# Patient Record
Sex: FEMALE | Race: WHITE | HISPANIC OR LATINO | Employment: FULL TIME | URBAN - METROPOLITAN AREA
[De-identification: names, ages, dates, MRNs, and addresses within clinical notes are randomized per-mention and may not be internally consistent; named-entity substitution may affect disease eponyms.]

---

## 2023-03-27 ENCOUNTER — OFFICE VISIT (OUTPATIENT)
Dept: FAMILY MEDICINE CLINIC | Facility: CLINIC | Age: 28
End: 2023-03-27

## 2023-03-27 VITALS
DIASTOLIC BLOOD PRESSURE: 80 MMHG | RESPIRATION RATE: 16 BRPM | WEIGHT: 179 LBS | BODY MASS INDEX: 28.09 KG/M2 | TEMPERATURE: 99.1 F | HEIGHT: 67 IN | HEART RATE: 77 BPM | OXYGEN SATURATION: 99 % | SYSTOLIC BLOOD PRESSURE: 118 MMHG

## 2023-03-27 DIAGNOSIS — E78.2 MIXED HYPERLIPIDEMIA: Primary | ICD-10-CM

## 2023-03-27 DIAGNOSIS — J02.9 SORE THROAT: ICD-10-CM

## 2023-03-27 DIAGNOSIS — J30.89 SEASONAL ALLERGIC RHINITIS DUE TO OTHER ALLERGIC TRIGGER: ICD-10-CM

## 2023-03-27 DIAGNOSIS — K21.9 GASTROESOPHAGEAL REFLUX DISEASE WITHOUT ESOPHAGITIS: ICD-10-CM

## 2023-03-27 DIAGNOSIS — Z00.01 ABNORMAL WELLNESS EXAM: ICD-10-CM

## 2023-03-27 PROBLEM — Z86.19 HISTORY OF LYME DISEASE: Status: ACTIVE | Noted: 2018-09-24

## 2023-03-27 PROBLEM — F90.9 ADHD: Status: ACTIVE | Noted: 2021-09-10

## 2023-03-27 PROBLEM — H60.543 ECZEMA OF EXTERNAL EAR, BILATERAL: Status: ACTIVE | Noted: 2018-03-30

## 2023-03-27 PROBLEM — R14.0 ABDOMINAL BLOATING: Status: ACTIVE | Noted: 2018-03-30

## 2023-03-27 PROBLEM — R19.8 ALTERNATING CONSTIPATION AND DIARRHEA: Status: ACTIVE | Noted: 2018-03-30

## 2023-03-27 PROBLEM — Z91.09 ENVIRONMENTAL ALLERGIES: Status: ACTIVE | Noted: 2021-08-13

## 2023-03-27 RX ORDER — ALPRAZOLAM 0.25 MG/1
0.25 TABLET ORAL DAILY
COMMUNITY
Start: 2023-02-02

## 2023-03-27 RX ORDER — UBIDECARENONE 75 MG
CAPSULE ORAL DAILY
COMMUNITY

## 2023-03-27 RX ORDER — LEVONORGESTREL AND ETHINYL ESTRADIOL 0.1-0.02MG
KIT ORAL
COMMUNITY
Start: 2023-03-16

## 2023-03-27 RX ORDER — FERROUS SULFATE 325(65) MG
325 TABLET ORAL
COMMUNITY

## 2023-03-27 RX ORDER — MELATONIN
5000 DAILY
COMMUNITY

## 2023-03-27 RX ORDER — FEXOFENADINE HCL 180 MG/1
180 TABLET ORAL
COMMUNITY

## 2023-03-27 RX ORDER — PANTOPRAZOLE SODIUM 40 MG/1
40 TABLET, DELAYED RELEASE ORAL
Qty: 30 TABLET | Refills: 1 | Status: SHIPPED | OUTPATIENT
Start: 2023-03-27

## 2023-03-27 RX ORDER — METFORMIN HYDROCHLORIDE 500 MG/1
TABLET, EXTENDED RELEASE ORAL
COMMUNITY
Start: 2023-02-08

## 2023-03-27 RX ORDER — DEXTROAMPHETAMINE SACCHARATE, AMPHETAMINE ASPARTATE, DEXTROAMPHETAMINE SULFATE AND AMPHETAMINE SULFATE 2.5; 2.5; 2.5; 2.5 MG/1; MG/1; MG/1; MG/1
TABLET ORAL
COMMUNITY
Start: 2023-02-18

## 2023-03-27 RX ORDER — FLUTICASONE PROPIONATE 50 MCG
2 SPRAY, SUSPENSION (ML) NASAL DAILY
Qty: 16 G | Refills: 0 | Status: SHIPPED | OUTPATIENT
Start: 2023-03-27

## 2023-03-28 ENCOUNTER — TELEPHONE (OUTPATIENT)
Dept: FAMILY MEDICINE CLINIC | Facility: CLINIC | Age: 28
End: 2023-03-28

## 2023-03-28 RX ORDER — SEMAGLUTIDE 0.5 MG/.5ML
INJECTION, SOLUTION SUBCUTANEOUS
Refills: 0 | OUTPATIENT
Start: 2023-03-28

## 2023-03-29 ENCOUNTER — TELEPHONE (OUTPATIENT)
Dept: FAMILY MEDICINE CLINIC | Facility: CLINIC | Age: 28
End: 2023-03-29

## 2023-04-02 PROBLEM — K21.9 GASTROESOPHAGEAL REFLUX DISEASE WITHOUT ESOPHAGITIS: Status: ACTIVE | Noted: 2023-04-02

## 2023-04-02 PROBLEM — Z00.01 ABNORMAL WELLNESS EXAM: Status: ACTIVE | Noted: 2023-04-02

## 2023-04-02 PROBLEM — J30.9 ALLERGIC RHINITIS DUE TO ALLERGEN: Status: ACTIVE | Noted: 2023-04-02

## 2023-04-02 PROBLEM — J02.9 SORE THROAT: Status: ACTIVE | Noted: 2023-04-02

## 2023-04-02 NOTE — ASSESSMENT & PLAN NOTE
She was given prescription for pantoprazole  Discussed about possible side effects  We will continue to monitor

## 2023-04-02 NOTE — ASSESSMENT & PLAN NOTE
She was given prescription for pantoprazole  Discussed about possible side effects  We will discussed with patient if symptoms continue I will consider seeing ENT

## 2023-04-02 NOTE — PROGRESS NOTES
Name: Melanie José      : 1995      MRN: [id number]  Encounter Provider: Devon Meza MD  Encounter Date: 3/27/2023   Encounter department: [address] [place] [address]  Mixed hyperlipidemia  Assessment & Plan:  Not well controlled  It was discussed about low-fat diet and regular exercise  Continue to check her fasting lipid profile  I will consider medication if it continues to be elevated  Orders:  -     CBC and differential; Future  -     Comprehensive metabolic panel; Future  -     Lipid Panel with Direct LDL reflex; Future  -     TSH, 3rd generation with Free T4 reflex; Future    2  Gastroesophageal reflux disease without esophagitis  Assessment & Plan:  She was given prescription for pantoprazole  Discussed about possible side effects  We will continue to monitor  Orders:  -     pantoprazole (PROTONIX) 40 mg tablet; Take 1 tablet (40 mg total) by mouth daily before breakfast    3  Seasonal allergic rhinitis due to other allergic trigger  Assessment & Plan:  Continue Allegra-D and was given prescription for Flonase nasal spray  She was controlled pantoprazole will help with her symptoms too  Orders:  -     fluticasone (FLONASE) 50 mcg/act nasal spray; 2 sprays into each nostril daily    4  Sore throat  Assessment & Plan:  She was given prescription for pantoprazole  Discussed about possible side effects  We will discussed with patient if symptoms continue I will consider seeing ENT  Orders:  -     pantoprazole (PROTONIX) 40 mg tablet; Take 1 tablet (40 mg total) by mouth daily before breakfast    5  BMI 28 0-28 9,adult  -     Semaglutide-Weight Management (WEGOVY) 0 5 MG/0 5ML; Inject 0 5 mL (0 5 mg total) under the skin once a week for 28 days    6  Abnormal wellness exam  Assessment & Plan: It was discussed about immunizations, diet, exercise and safety measures            Depression Screening and Follow-up Plan: Patient was screened for depression during today's encounter  They screened negative with a PHQ-2 score of 0  Subjective     She continues to be symptomatic problems and with complaint of having increased symptoms of allergic rhinitis and sore throat for the last few weeks  She denies any fever or chills  She also complains of symptoms of acid reflux on and off  She has history of hyperlipidemia  Review of Systems   Constitutional: Negative for chills and fever  HENT: Positive for congestion, postnasal drip and sore throat  Negative for trouble swallowing  Eyes: Negative for visual disturbance  Respiratory: Negative for cough and shortness of breath  Cardiovascular: Negative for chest pain, palpitations and leg swelling  Gastrointestinal: Negative for abdominal pain, constipation and diarrhea  Endocrine: Negative for cold intolerance and heat intolerance  Genitourinary: Negative for difficulty urinating and dysuria  Musculoskeletal: Negative for gait problem  Skin: Negative for rash  Neurological: Negative for dizziness, tremors, seizures and headaches  Hematological: Negative for adenopathy  Psychiatric/Behavioral: Negative for behavioral problems  Past Medical History:   Diagnosis Date   • ADHD    • Anxiety    • IBS (irritable bowel syndrome)      Past Surgical History:   Procedure Laterality Date   • SINUS SURGERY       Family History   Problem Relation Age of Onset   • Stroke Father    • ADD / ADHD Brother    • Breast cancer Maternal Grandmother    • Hodgkin's lymphoma Maternal Aunt      Social History     Socioeconomic History   • Marital status: Single     Spouse name: None   • Number of children: None   • Years of education: None   • Highest education level: None   Occupational History   • None   Tobacco Use   • Smoking status: Never     Passive exposure: Never   • Smokeless tobacco: Never   Vaping Use   • Vaping Use: Some days   Substance and Sexual Activity   • Alcohol use:  Yes   • Drug use: Never   • Sexual activity: None   Other Topics Concern   • None   Social History Narrative   • None     Social Determinants of Health     Financial Resource Strain: Not on file   Food Insecurity: Not on file   Transportation Needs: Not on file   Physical Activity: Not on file   Stress: Not on file   Social Connections: Not on file   Intimate Partner Violence: Not on file   Housing Stability: Not on file     Current Outpatient Medications on File Prior to Visit   Medication Sig   • ALPRAZolam (XANAX) 0 25 mg tablet Take 0 25 mg by mouth daily   • amphetamine-dextroamphetamine (ADDERALL) 10 mg tablet TAKE 1 TABLET BY MOUTH TWICE A DAY DURING WAKING HOURS (DNF 11/25/22)   • Balcoltra 0 1-20 MG-MCG(21) TABS TAKE 1 TABLET BY ORAL ROUTE EVERY DAY FOR 21 DAYS, FOLLOWED BY 7 DAYS   • cholecalciferol (VITAMIN D3) 1,000 units tablet Take 5,000 Units by mouth daily   • cyanocobalamin (VITAMIN B-12) 100 mcg tablet Take by mouth daily   • ferrous sulfate 325 (65 Fe) mg tablet Take 325 mg by mouth daily with breakfast   • fexofenadine (ALLEGRA) 180 MG tablet Take 180 mg by mouth   • metFORMIN (GLUCOPHAGE-XR) 500 mg 24 hr tablet TAKE 1 TABLET BY ORAL ROUTE IN THE MORNING AND 2 TABLETS AT DINNER TIME EVERY DAY     Allergies   Allergen Reactions   • Sulfa Antibiotics GI Intolerance and Other (See Comments)     Abdominal pain       Immunization History   Administered Date(s) Administered   • COVID-19 PFIZER VACCINE 0 3 ML IM 04/16/2021, 05/07/2021   • DTaP / HiB 1994, 02/27/1995, 03/28/1995   • DTaP / HiB / IPV 01/04/1995, 03/10/1995, 05/12/1995, 08/26/1995, 08/26/1999   • DTaP 5 09/13/1996, 11/18/1998   • HPV Quadrivalent 05/28/2008, 08/01/2008, 11/12/2009, 02/06/2012, 04/12/2012, 07/02/2013   • Hep B, Adolescent or Pediatric 1994, 1994, 03/28/1995   • Hep B, adult 1994, 01/04/1995, 03/12/1995, 11/12/2009   • HiB 04/03/1996   • INFLUENZA 09/24/2018, 09/24/2018, 09/10/2021, 09/10/2021   • MMR 04/03/1996, "11/18/1998, 08/26/1999, 11/30/1999   • Meningococcal 07/25/2012   • Meningococcal ACWY, unspecified 05/21/2007, 07/02/2013   • Meningococcal Conjugate (MCV4O) 07/25/2012   • Meningococcal MCV4P 05/21/2007, 07/02/2013   • Meningococcal, Unknown Serogroups 07/25/2012   • OPV 1994, 01/04/1995, 02/27/1995, 03/10/1995, 03/28/1995, 09/13/1996, 11/18/1998, 08/26/1999   • Tdap 08/30/2006, 03/06/2008, 07/23/2016   • Varicella 04/03/1996, 05/23/2005, 03/06/2008, 10/18/2010       Objective     /80 (BP Location: Left arm, Patient Position: Sitting, Cuff Size: Large)   Pulse 77   Temp 99 1 °F (37 3 °C) (Tympanic)   Resp 16   Ht 5' 7\" (1 702 m)   Wt 81 2 kg (179 lb)   LMP  (LMP Unknown)   SpO2 99%   BMI 28 04 kg/m²     Physical Exam  Vitals and nursing note reviewed  Constitutional:       Appearance: She is well-developed  HENT:      Head: Normocephalic and atraumatic  Right Ear: A middle ear effusion is present  Left Ear: A middle ear effusion is present  Mouth/Throat:      Pharynx: Posterior oropharyngeal erythema present  Eyes:      Pupils: Pupils are equal, round, and reactive to light  Cardiovascular:      Rate and Rhythm: Normal rate and regular rhythm  Heart sounds: Normal heart sounds  Pulmonary:      Effort: Pulmonary effort is normal       Breath sounds: Normal breath sounds  Abdominal:      General: Bowel sounds are normal       Palpations: Abdomen is soft  Musculoskeletal:         General: Normal range of motion  Cervical back: Normal range of motion and neck supple  Lymphadenopathy:      Cervical: No cervical adenopathy  Skin:     General: Skin is warm and dry  Neurological:      Mental Status: She is alert and oriented to person, place, and time  Cranial Nerves: No cranial nerve deficit  Glenna Lopez MD BMI Counseling: Body mass index is 28 04 kg/m²   The BMI is above normal  Nutrition recommendations include reducing portion sizes, " decreasing overall calorie intake and 3-5 servings of fruits/vegetables daily  Exercise recommendations include moderate aerobic physical activity for 150 minutes/week

## 2023-04-02 NOTE — ASSESSMENT & PLAN NOTE
Not well controlled  It was discussed about low-fat diet and regular exercise  Continue to check her fasting lipid profile  I will consider medication if it continues to be elevated

## 2023-04-02 NOTE — PROGRESS NOTES
Name: Bertha Zheng      : 1995      MRN: [id number]  Encounter Provider: Cindy Ireland MD  Encounter Date: 3/27/2023   Encounter department: [address] [place] [address]  Mixed hyperlipidemia  Assessment & Plan:  Not well controlled  It was discussed about low-fat diet and regular exercise  Continue to check her fasting lipid profile  I will consider medication if it continues to be elevated  Orders:  -     CBC and differential; Future  -     Comprehensive metabolic panel; Future  -     Lipid Panel with Direct LDL reflex; Future  -     TSH, 3rd generation with Free T4 reflex; Future    2  Gastroesophageal reflux disease without esophagitis  Assessment & Plan:  She was given prescription for pantoprazole  Discussed about possible side effects  We will continue to monitor  Orders:  -     pantoprazole (PROTONIX) 40 mg tablet; Take 1 tablet (40 mg total) by mouth daily before breakfast    3  Seasonal allergic rhinitis due to other allergic trigger  Assessment & Plan:  Continue Allegra-D and was given prescription for Flonase nasal spray  She was controlled pantoprazole will help with her symptoms too  Orders:  -     fluticasone (FLONASE) 50 mcg/act nasal spray; 2 sprays into each nostril daily    4  Sore throat  Assessment & Plan:  She was given prescription for pantoprazole  Discussed about possible side effects  We will discussed with patient if symptoms continue I will consider seeing ENT  Orders:  -     pantoprazole (PROTONIX) 40 mg tablet; Take 1 tablet (40 mg total) by mouth daily before breakfast    5  BMI 28 0-28 9,adult  -     Semaglutide-Weight Management (WEGOVY) 0 5 MG/0 5ML; Inject 0 5 mL (0 5 mg total) under the skin once a week for 28 days    6  Abnormal wellness exam  Assessment & Plan: It was discussed about immunizations, diet, exercise and safety measures               Subjective     She continues to be symptomatic problems and with complaint of having increased symptoms of allergic rhinitis and sore throat for the last few weeks  She denies any fever or chills  She also complains of symptoms of acid reflux on and off  She has history of hyperlipidemia  Review of Systems   Constitutional: Negative for chills and fever  HENT: Positive for congestion, postnasal drip and sore throat  Negative for trouble swallowing  Eyes: Negative for visual disturbance  Respiratory: Negative for cough and shortness of breath  Cardiovascular: Negative for chest pain, palpitations and leg swelling  Gastrointestinal: Negative for abdominal pain, constipation and diarrhea  Endocrine: Negative for cold intolerance and heat intolerance  Genitourinary: Negative for difficulty urinating and dysuria  Musculoskeletal: Negative for gait problem  Skin: Negative for rash  Neurological: Negative for dizziness, tremors, seizures and headaches  Hematological: Negative for adenopathy  Psychiatric/Behavioral: Negative for behavioral problems  Past Medical History:   Diagnosis Date   • ADHD    • Anxiety    • IBS (irritable bowel syndrome)      Past Surgical History:   Procedure Laterality Date   • SINUS SURGERY       Family History   Problem Relation Age of Onset   • Stroke Father    • ADD / ADHD Brother    • Breast cancer Maternal Grandmother    • Hodgkin's lymphoma Maternal Aunt      Social History     Socioeconomic History   • Marital status: Single     Spouse name: None   • Number of children: None   • Years of education: None   • Highest education level: None   Occupational History   • None   Tobacco Use   • Smoking status: Never     Passive exposure: Never   • Smokeless tobacco: Never   Vaping Use   • Vaping Use: Some days   Substance and Sexual Activity   • Alcohol use:  Yes   • Drug use: Never   • Sexual activity: None   Other Topics Concern   • None   Social History Narrative   • None     Social Determinants of Health     Financial Resource Strain: Not on file   Food Insecurity: Not on file   Transportation Needs: Not on file   Physical Activity: Not on file   Stress: Not on file   Social Connections: Not on file   Intimate Partner Violence: Not on file   Housing Stability: Not on file     Current Outpatient Medications on File Prior to Visit   Medication Sig   • ALPRAZolam (XANAX) 0 25 mg tablet Take 0 25 mg by mouth daily   • amphetamine-dextroamphetamine (ADDERALL) 10 mg tablet TAKE 1 TABLET BY MOUTH TWICE A DAY DURING WAKING HOURS (DNF 11/25/22)   • Balcoltra 0 1-20 MG-MCG(21) TABS TAKE 1 TABLET BY ORAL ROUTE EVERY DAY FOR 21 DAYS, FOLLOWED BY 7 DAYS   • cholecalciferol (VITAMIN D3) 1,000 units tablet Take 5,000 Units by mouth daily   • cyanocobalamin (VITAMIN B-12) 100 mcg tablet Take by mouth daily   • ferrous sulfate 325 (65 Fe) mg tablet Take 325 mg by mouth daily with breakfast   • fexofenadine (ALLEGRA) 180 MG tablet Take 180 mg by mouth   • metFORMIN (GLUCOPHAGE-XR) 500 mg 24 hr tablet TAKE 1 TABLET BY ORAL ROUTE IN THE MORNING AND 2 TABLETS AT DINNER TIME EVERY DAY     Allergies   Allergen Reactions   • Sulfa Antibiotics GI Intolerance and Other (See Comments)     Abdominal pain       Immunization History   Administered Date(s) Administered   • COVID-19 PFIZER VACCINE 0 3 ML IM 04/16/2021, 05/07/2021   • DTaP / HiB 1994, 02/27/1995, 03/28/1995   • DTaP / HiB / IPV 01/04/1995, 03/10/1995, 05/12/1995, 08/26/1995, 08/26/1999   • DTaP 5 09/13/1996, 11/18/1998   • HPV Quadrivalent 05/28/2008, 08/01/2008, 11/12/2009, 02/06/2012, 04/12/2012, 07/02/2013   • Hep B, Adolescent or Pediatric 1994, 1994, 03/28/1995   • Hep B, adult 1994, 01/04/1995, 03/12/1995, 11/12/2009   • HiB 04/03/1996   • INFLUENZA 09/24/2018, 09/24/2018, 09/10/2021, 09/10/2021   • MMR 04/03/1996, 11/18/1998, 08/26/1999, 11/30/1999   • Meningococcal 07/25/2012   • Meningococcal ACWY, unspecified 05/21/2007, 07/02/2013   • Meningococcal Conjugate "(MCV4O) 07/25/2012   • Meningococcal MCV4P 05/21/2007, 07/02/2013   • Meningococcal, Unknown Serogroups 07/25/2012   • OPV 1994, 01/04/1995, 02/27/1995, 03/10/1995, 03/28/1995, 09/13/1996, 11/18/1998, 08/26/1999   • Tdap 08/30/2006, 03/06/2008, 07/23/2016   • Varicella 04/03/1996, 05/23/2005, 03/06/2008, 10/18/2010       Objective     /80 (BP Location: Left arm, Patient Position: Sitting, Cuff Size: Large)   Pulse 77   Temp 99 1 °F (37 3 °C) (Tympanic)   Resp 16   Ht 5' 7\" (1 702 m)   Wt 81 2 kg (179 lb)   LMP  (LMP Unknown)   SpO2 99%   BMI 28 04 kg/m²     Physical Exam  Vitals and nursing note reviewed  Constitutional:       Appearance: She is well-developed  HENT:      Head: Normocephalic and atraumatic  Right Ear: A middle ear effusion is present  Left Ear: A middle ear effusion is present  Mouth/Throat:      Pharynx: Posterior oropharyngeal erythema present  Eyes:      Pupils: Pupils are equal, round, and reactive to light  Cardiovascular:      Rate and Rhythm: Normal rate and regular rhythm  Heart sounds: Normal heart sounds  Pulmonary:      Effort: Pulmonary effort is normal       Breath sounds: Normal breath sounds  Abdominal:      General: Bowel sounds are normal       Palpations: Abdomen is soft  Musculoskeletal:         General: Normal range of motion  Cervical back: Normal range of motion and neck supple  Lymphadenopathy:      Cervical: No cervical adenopathy  Skin:     General: Skin is warm and dry  Neurological:      Mental Status: She is alert and oriented to person, place, and time  Cranial Nerves: No cranial nerve deficit         Jose Harrington MD  "

## 2023-04-02 NOTE — ASSESSMENT & PLAN NOTE
Continue Allegra-D and was given prescription for Flonase nasal spray  She was controlled pantoprazole will help with her symptoms too

## 2023-04-10 NOTE — TELEPHONE ENCOUNTER
Pt wanted to know if we can send a formulary replacement or something else since her insurance wont cover it   Please call pt to advise

## 2023-04-28 DIAGNOSIS — J30.89 SEASONAL ALLERGIC RHINITIS DUE TO OTHER ALLERGIC TRIGGER: ICD-10-CM

## 2023-04-28 DIAGNOSIS — K21.9 GASTROESOPHAGEAL REFLUX DISEASE WITHOUT ESOPHAGITIS: ICD-10-CM

## 2023-04-28 DIAGNOSIS — J02.9 SORE THROAT: ICD-10-CM

## 2023-04-28 RX ORDER — FLUTICASONE PROPIONATE 50 MCG
SPRAY, SUSPENSION (ML) NASAL
Qty: 48 ML | Refills: 1 | Status: SHIPPED | OUTPATIENT
Start: 2023-04-28

## 2023-04-28 RX ORDER — PANTOPRAZOLE SODIUM 40 MG/1
TABLET, DELAYED RELEASE ORAL
Qty: 90 TABLET | Refills: 1 | Status: SHIPPED | OUTPATIENT
Start: 2023-04-28

## 2023-05-11 RX ORDER — SEMAGLUTIDE 0.5 MG/.5ML
INJECTION, SOLUTION SUBCUTANEOUS
OUTPATIENT
Start: 2023-05-11

## 2023-05-11 NOTE — TELEPHONE ENCOUNTER
pharmacy requesting refill on wegovy 0 5mg   I sent next dose of 1mg to the pharmacy     Last seen 3/27/23  Has appt 5/17/23

## 2023-05-15 ENCOUNTER — OFFICE VISIT (OUTPATIENT)
Dept: FAMILY MEDICINE CLINIC | Facility: CLINIC | Age: 28
End: 2023-05-15

## 2023-05-15 VITALS
OXYGEN SATURATION: 99 % | HEART RATE: 78 BPM | RESPIRATION RATE: 16 BRPM | DIASTOLIC BLOOD PRESSURE: 62 MMHG | TEMPERATURE: 98.2 F | WEIGHT: 167 LBS | HEIGHT: 67 IN | SYSTOLIC BLOOD PRESSURE: 108 MMHG | BODY MASS INDEX: 26.21 KG/M2

## 2023-05-15 DIAGNOSIS — J01.00 ACUTE NON-RECURRENT MAXILLARY SINUSITIS: ICD-10-CM

## 2023-05-15 DIAGNOSIS — F90.2 ATTENTION DEFICIT HYPERACTIVITY DISORDER (ADHD), COMBINED TYPE: ICD-10-CM

## 2023-05-15 DIAGNOSIS — R11.0 NAUSEA: ICD-10-CM

## 2023-05-15 DIAGNOSIS — E78.2 MIXED HYPERLIPIDEMIA: ICD-10-CM

## 2023-05-15 RX ORDER — ONDANSETRON 4 MG/1
4 TABLET, FILM COATED ORAL EVERY 8 HOURS PRN
Qty: 20 TABLET | Refills: 0 | Status: SHIPPED | OUTPATIENT
Start: 2023-05-15

## 2023-05-15 RX ORDER — DEXTROAMPHETAMINE SACCHARATE, AMPHETAMINE ASPARTATE, DEXTROAMPHETAMINE SULFATE AND AMPHETAMINE SULFATE 2.5; 2.5; 2.5; 2.5 MG/1; MG/1; MG/1; MG/1
10 TABLET ORAL
Qty: 60 TABLET | Refills: 0 | Status: SHIPPED | OUTPATIENT
Start: 2023-05-15

## 2023-05-15 RX ORDER — BENZONATATE 200 MG/1
200 CAPSULE ORAL 3 TIMES DAILY PRN
Qty: 20 CAPSULE | Refills: 0 | Status: SHIPPED | OUTPATIENT
Start: 2023-05-15

## 2023-05-15 RX ORDER — AMOXICILLIN AND CLAVULANATE POTASSIUM 875; 125 MG/1; MG/1
1 TABLET, FILM COATED ORAL EVERY 12 HOURS SCHEDULED
Qty: 20 TABLET | Refills: 0 | Status: SHIPPED | OUTPATIENT
Start: 2023-05-15 | End: 2023-05-25

## 2023-05-15 NOTE — PROGRESS NOTES
Name: Huang Ramos      : 1995      MRN: [id number]  Encounter Provider: Alta Bowling MD  Encounter Date: 5/15/2023   Encounter department: [address] [place] [address]  BMI 28 0-28 9,adult  Assessment & Plan:  Currently on 0 5mg wegovy with nausea after first dose as side effect; nausea eventually subsides  Lost 12 lbs in 6 weeks  Increased wegovy to 1mg  Encourage low carb diet and increase exercise  Continue to monitor weight  Follow up in 1 month    Orders:  -     Semaglutide-Weight Management (WEGOVY) 1 MG/0 5ML; Inject 0 5 mL (1 mg total) under the skin once a week for 28 days    2  Nausea  Assessment & Plan:  Side effect of wegovy  Prescribed zofran PRN    Orders:  -     ondansetron (ZOFRAN) 4 mg tablet; Take 1 tablet (4 mg total) by mouth every 8 (eight) hours as needed for nausea or vomiting    3  Acute non-recurrent maxillary sinusitis  Assessment & Plan:  Maxillary congestion, chest congestion, productive cough, sore throat, bilateral ear discomfort x5 days and worsening   Prescribe augmentin and tessalon for cough  May continue flonase, allegra, dayquil, and mucinex  Encourage to stay hydrated    Orders:  -     amoxicillin-clavulanate (AUGMENTIN) 875-125 mg per tablet; Take 1 tablet by mouth every 12 (twelve) hours for 10 days  -     benzonatate (TESSALON) 200 MG capsule; Take 1 capsule (200 mg total) by mouth 3 (three) times a day as needed for cough    4  Mixed hyperlipidemia  Assessment & Plan:  23 lipid panel WNL - improved  Encourage low fat diet and increase exercise   Continue to monitor lipid panel      5  Attention deficit hyperactivity disorder (ADHD), combined type  Assessment & Plan:  Was seeing provider online for management but will transition to PCP management   Prescribed adderall  Continue to monitor symptoms     Orders:  -     amphetamine-dextroamphetamine (ADDERALL) 10 mg tablet;  Take 1 tablet (10 mg total) by mouth 2 (two) times a day Max Daily Amount: 20 mg           Subjective     B SHIRLEY  is a 28 y o  female with history of obesity, GERD, HLD, and ADHD presenting today for weight loss management and URI symptoms  Currently on 0 5mg of wegovy  Lost 12 lbs in 6 weeks  States she feels some nausea few days after first dose but is tolerable and subsides  Also complains of productive cough, sore throat, bilateral ear discomfort, and nasal/chest congestion x5 days  Denies fevers/chills, SOB, or CP  Has seasonal allergies which she takes Flonase and allegra as prescribed  Feels her symptoms are worse compared to her typical allergy symptoms and are getting worse  Also has been taking dayquil and mucinex with little relief  Traveled back from [place] last week and boyfriend is at home with same symptoms  Review of Systems   Constitutional: Negative for fatigue and fever  HENT: Positive for congestion, ear pain, sinus pressure (maxillary congestion) and sore throat  Negative for ear discharge, postnasal drip, rhinorrhea, sinus pain and trouble swallowing  Eyes: Negative for discharge and redness  Respiratory: Positive for cough and chest tightness  Negative for shortness of breath and wheezing  Cardiovascular: Negative for chest pain and palpitations  Gastrointestinal: Negative for abdominal pain, diarrhea, nausea and vomiting  Musculoskeletal: Negative for arthralgias and myalgias  Skin: Negative for rash  Neurological: Negative for dizziness, weakness and headaches         Past Medical History:   Diagnosis Date   • ADHD    • Anxiety    • IBS (irritable bowel syndrome)      Past Surgical History:   Procedure Laterality Date   • SINUS SURGERY       Family History   Problem Relation Age of Onset   • Stroke Father    • ADD / ADHD Brother    • Breast cancer Maternal Grandmother    • Hodgkin's lymphoma Maternal Aunt      Social History     Socioeconomic History   • Marital status: Single     Spouse name: None   • Number of children: None   • Years of education: None   • Highest education level: None   Occupational History   • None   Tobacco Use   • Smoking status: Never     Passive exposure: Never   • Smokeless tobacco: Never   Vaping Use   • Vaping Use: Some days   Substance and Sexual Activity   • Alcohol use:  Yes   • Drug use: Never   • Sexual activity: None   Other Topics Concern   • None   Social History Narrative   • None     Social Determinants of Health     Financial Resource Strain: Not on file   Food Insecurity: Not on file   Transportation Needs: Not on file   Physical Activity: Not on file   Stress: Not on file   Social Connections: Not on file   Intimate Partner Violence: Not on file   Housing Stability: Not on file     Current Outpatient Medications on File Prior to Visit   Medication Sig   • ALPRAZolam (XANAX) 0 25 mg tablet Take 0 25 mg by mouth daily   • cholecalciferol (VITAMIN D3) 1,000 units tablet Take 5,000 Units by mouth daily   • cyanocobalamin (VITAMIN B-12) 100 mcg tablet Take by mouth daily   • ferrous sulfate 325 (65 Fe) mg tablet Take 325 mg by mouth daily with breakfast   • fexofenadine (ALLEGRA) 180 MG tablet Take 180 mg by mouth   • fluticasone (FLONASE) 50 mcg/act nasal spray SPRAY 2 SPRAYS INTO EACH NOSTRIL EVERY DAY   • pantoprazole (PROTONIX) 40 mg tablet TAKE 1 TABLET BY MOUTH DAILY BEFORE BREAKFAST   • [DISCONTINUED] amphetamine-dextroamphetamine (ADDERALL) 10 mg tablet TAKE 1 TABLET BY MOUTH TWICE A DAY DURING WAKING HOURS (DNF 11/25/22)   • [DISCONTINUED] Semaglutide-Weight Management (WEGOVY) 1 MG/0 5ML Inject 0 5 mL (1 mg total) under the skin once a week for 28 days   • Balcoltra 0 1-20 MG-MCG(21) TABS TAKE 1 TABLET BY ORAL ROUTE EVERY DAY FOR 21 DAYS, FOLLOWED BY 7 DAYS   • metFORMIN (GLUCOPHAGE-XR) 500 mg 24 hr tablet TAKE 1 TABLET BY ORAL ROUTE IN THE MORNING AND 2 TABLETS AT DINNER TIME EVERY DAY (Patient not taking: Reported on 5/15/2023)     Allergies   Allergen Reactions   • Sulfa "Antibiotics GI Intolerance and Other (See Comments)     Abdominal pain       Immunization History   Administered Date(s) Administered   • COVID-19 PFIZER VACCINE 0 3 ML IM 04/16/2021, 05/07/2021   • DTaP / HiB 1994, 02/27/1995, 03/28/1995   • DTaP / HiB / IPV 01/04/1995, 03/10/1995, 05/12/1995, 08/26/1995, 08/26/1999   • DTaP 5 09/13/1996, 11/18/1998   • HPV Quadrivalent 05/28/2008, 08/01/2008, 11/12/2009, 02/06/2012, 04/12/2012, 07/02/2013   • Hep B, Adolescent or Pediatric 1994, 1994, 03/28/1995   • Hep B, adult 1994, 01/04/1995, 03/12/1995, 11/12/2009   • HiB 04/03/1996   • INFLUENZA 09/24/2018, 09/24/2018, 09/10/2021, 09/10/2021   • MMR 04/03/1996, 11/18/1998, 08/26/1999, 11/30/1999   • Meningococcal 07/25/2012   • Meningococcal ACWY, unspecified 05/21/2007, 07/02/2013   • Meningococcal Conjugate (MCV4O) 07/25/2012   • Meningococcal MCV4P 05/21/2007, 07/02/2013   • Meningococcal, Unknown Serogroups 07/25/2012   • OPV 1994, 01/04/1995, 02/27/1995, 03/10/1995, 03/28/1995, 09/13/1996, 11/18/1998, 08/26/1999   • Tdap 08/30/2006, 03/06/2008, 07/23/2016   • Varicella 04/03/1996, 05/23/2005, 03/06/2008, 10/18/2010       Objective     /62 (BP Location: Left arm, Patient Position: Sitting, Cuff Size: Adult)   Pulse 78   Temp 98 2 °F (36 8 °C) (Tympanic)   Resp 16   Ht 5' 7\" (1 702 m)   Wt 75 8 kg (167 lb)   SpO2 99%   BMI 26 16 kg/m²     Physical Exam  Constitutional:       Appearance: Normal appearance  HENT:      Head: Normocephalic and atraumatic  Right Ear: Ear canal and external ear normal       Left Ear: Ear canal and external ear normal       Ears:      Comments: Fluid behind bilateral TMs without erythema     Nose: Congestion present  No rhinorrhea  Mouth/Throat:      Mouth: Mucous membranes are moist       Pharynx: Oropharynx is clear  Posterior oropharyngeal erythema present  No oropharyngeal exudate        Comments: No tonsillar hypertrophy  Eyes:      " Extraocular Movements: Extraocular movements intact  Conjunctiva/sclera: Conjunctivae normal    Cardiovascular:      Rate and Rhythm: Normal rate and regular rhythm  Heart sounds: No murmur heard  Pulmonary:      Effort: Pulmonary effort is normal       Breath sounds: Normal breath sounds  No wheezing, rhonchi or rales  Abdominal:      Palpations: Abdomen is soft  Tenderness: There is no abdominal tenderness  Musculoskeletal:         General: No swelling  Cervical back: Neck supple  No tenderness  Right lower leg: No edema  Left lower leg: No edema  Lymphadenopathy:      Cervical: No cervical adenopathy  Skin:     General: Skin is warm  Findings: No rash  Neurological:      Mental Status: She is alert and oriented to person, place, and time     Psychiatric:         Mood and Affect: Mood normal          Behavior: Behavior normal        Fernanda Richard MD

## 2023-05-15 NOTE — ASSESSMENT & PLAN NOTE
5/12/23 lipid panel WNL - improved  Encourage low fat diet and increase exercise   Continue to monitor lipid panel

## 2023-05-15 NOTE — ASSESSMENT & PLAN NOTE
Currently on 0 5mg wegovy with nausea after first dose as side effect; nausea eventually subsides  Lost 12 lbs in 6 weeks  Increased wegovy to 1mg  Encourage low carb diet and increase exercise  Continue to monitor weight  Follow up in 1 month

## 2023-05-15 NOTE — ASSESSMENT & PLAN NOTE
Maxillary congestion, chest congestion, productive cough, sore throat, bilateral ear discomfort x5 days and worsening   Prescribe augmentin and tessalon for cough  May continue flonase, allegra, dayquil, and mucinex  Encourage to stay hydrated

## 2023-05-15 NOTE — ASSESSMENT & PLAN NOTE
Was seeing provider online for management but will transition to PCP management   Prescribed adderall  Continue to monitor symptoms

## 2023-05-16 ENCOUNTER — TELEPHONE (OUTPATIENT)
Dept: FAMILY MEDICINE CLINIC | Facility: CLINIC | Age: 28
End: 2023-05-16

## 2023-05-19 NOTE — TELEPHONE ENCOUNTER
Per cover my meds , adderall does not require a prior auth , medication is covered  L/m informing [facility] PT aware

## 2023-06-12 ENCOUNTER — OFFICE VISIT (OUTPATIENT)
Dept: FAMILY MEDICINE CLINIC | Facility: CLINIC | Age: 28
End: 2023-06-12
Payer: COMMERCIAL

## 2023-06-12 VITALS
HEART RATE: 74 BPM | WEIGHT: 161 LBS | RESPIRATION RATE: 16 BRPM | HEIGHT: 67 IN | DIASTOLIC BLOOD PRESSURE: 64 MMHG | TEMPERATURE: 98.3 F | OXYGEN SATURATION: 97 % | SYSTOLIC BLOOD PRESSURE: 114 MMHG | BODY MASS INDEX: 25.27 KG/M2

## 2023-06-12 DIAGNOSIS — R11.0 NAUSEA: ICD-10-CM

## 2023-06-12 DIAGNOSIS — F90.9 ATTENTION DEFICIT HYPERACTIVITY DISORDER (ADHD), UNSPECIFIED ADHD TYPE: ICD-10-CM

## 2023-06-12 DIAGNOSIS — F90.2 ATTENTION DEFICIT HYPERACTIVITY DISORDER (ADHD), COMBINED TYPE: ICD-10-CM

## 2023-06-12 DIAGNOSIS — K21.9 GASTROESOPHAGEAL REFLUX DISEASE WITHOUT ESOPHAGITIS: ICD-10-CM

## 2023-06-12 DIAGNOSIS — Z30.011 ORAL CONTRACEPTION INITIAL PRESCRIPTION: ICD-10-CM

## 2023-06-12 DIAGNOSIS — F41.1 GENERALIZED ANXIETY DISORDER: ICD-10-CM

## 2023-06-12 PROCEDURE — 99214 OFFICE O/P EST MOD 30 MIN: CPT | Performed by: FAMILY MEDICINE

## 2023-06-12 RX ORDER — LEVONORGESTREL AND ETHINYL ESTRADIOL 0.1-0.02MG
KIT ORAL
Qty: 21 TABLET | Refills: 5 | Status: SHIPPED | OUTPATIENT
Start: 2023-06-12

## 2023-06-12 RX ORDER — DEXTROAMPHETAMINE SACCHARATE, AMPHETAMINE ASPARTATE, DEXTROAMPHETAMINE SULFATE AND AMPHETAMINE SULFATE 2.5; 2.5; 2.5; 2.5 MG/1; MG/1; MG/1; MG/1
10 TABLET ORAL
Qty: 60 TABLET | Refills: 0 | Status: SHIPPED | OUTPATIENT
Start: 2023-06-12

## 2023-06-12 NOTE — PROGRESS NOTES
Name: Bernardo Alvarez      : 1995      MRN: [id number]  Encounter Provider: Kin Zavala MD  Encounter Date: 2023   Encounter department: [address] [place] [address]  BMI 25 0-25 9,adult  Assessment & Plan:  - patient currently on WEGOVY 1mg  Agreeable to increase to 1 7mg  - lost 6 lbs since last appointment  Down 18 lbs since starting [facility] 2023  - nausea as a side effect  - encouraged to continue with diet and exercise  - will continue to monitor  - plan for follow up in 1 month    Orders:  -     Semaglutide-Weight Management (WEGOVY) 1 7 MG/0 75ML; Inject 0 75 mL (1 7 mg total) under the skin once a week    2  Nausea  Assessment & Plan:  - side effect if WEGOVY  - educated on increased likelihood of symptoms with increased dose  - take zofran as needed for symptom management  - will continue to monitor      3  Gastroesophageal reflux disease without esophagitis  Assessment & Plan:  - well controlled on pantoprazole 40mg daily  - will continue to monitor      4  Generalized anxiety disorder  Assessment & Plan:  - stable  Well managed on xanax 0 25mg   - continue with current medications  - will continue to monitor      5  Attention deficit hyperactivity disorder (ADHD), unspecified ADHD type  Assessment & Plan:  - well controlled on adderall 10mg BID  - stable with no symptoms  - continue with current medications  - will continue to monitor      6  Oral contraception initial prescription  Assessment & Plan:  - patient was being managed by previous PCP  - prescribed balcoltra 0 1-20 mg-mcg tabs  - patient aware of side effects  - will continue to monitor    Orders:  -     Balcoltra 0 1-20 MG-MCG(21) TABS; Take 1 tablet by mouth every day for 21 days, followed by 7 days    7   Attention deficit hyperactivity disorder (ADHD), combined type  Assessment & Plan:  - well controlled on adderall 10mg BID  - stable with no symptoms  - continue with current medications  - will continue to monitor    Orders:  -     amphetamine-dextroamphetamine (ADDERALL) 10 mg tablet; Take 1 tablet (10 mg total) by mouth 2 (two) times a day Max Daily Amount: 20 mg           Subjective     BR is a 28year old female presenting for follow up for weight loss management  She has no concerns or complaints today  She has lost 6 lbs since her last appointment  She is currently on WEGOVY 1mg  Denies any side effects  She is agreeable to advance to [facility] STACEY ROVERTO 1 7mg  Educated on side effects  Patient has been taking Zofran for nausea and fiber foe constipation as needed  Patient with questions about ankylosing spondylitis due to family history and back pain symptoms  Patient educated on ankylosing spondylitis  Advised to call the office if she experiences worsening symptoms  Discussed HLA-B27 marker and indications  Denies any chest pain, shortness of breath, abdominal pain or changes to her bowel or bladder function  Review of Systems   Constitutional: Negative for chills and fever  HENT: Negative for ear pain and sore throat  Eyes: Negative for pain and visual disturbance  Respiratory: Negative for cough and shortness of breath  Cardiovascular: Negative for chest pain and palpitations  Gastrointestinal: Negative for abdominal pain, constipation, diarrhea, nausea and vomiting  Genitourinary: Negative for dysuria and hematuria  Musculoskeletal: Negative for arthralgias and back pain  Skin: Negative for color change and rash  Neurological: Negative for dizziness, light-headedness and headaches  All other systems reviewed and are negative        Past Medical History:   Diagnosis Date   • ADHD    • Anxiety    • IBS (irritable bowel syndrome)      Past Surgical History:   Procedure Laterality Date   • SINUS SURGERY       Family History   Problem Relation Age of Onset   • Stroke Father    • ADD / ADHD Brother    • Breast cancer Maternal Grandmother    • Hodgkin's lymphoma Maternal Aunt      Social History     Socioeconomic History   • Marital status: Single     Spouse name: None   • Number of children: None   • Years of education: None   • Highest education level: None   Occupational History   • None   Tobacco Use   • Smoking status: Never     Passive exposure: Never   • Smokeless tobacco: Never   Vaping Use   • Vaping Use: Some days   Substance and Sexual Activity   • Alcohol use:  Yes   • Drug use: Never   • Sexual activity: None   Other Topics Concern   • None   Social History Narrative   • None     Social Determinants of Health     Financial Resource Strain: Not on file   Food Insecurity: Not on file   Transportation Needs: Not on file   Physical Activity: Not on file   Stress: Not on file   Social Connections: Not on file   Intimate Partner Violence: Not on file   Housing Stability: Not on file     Current Outpatient Medications on File Prior to Visit   Medication Sig   • ALPRAZolam (XANAX) 0 25 mg tablet Take 0 25 mg by mouth daily   • cholecalciferol (VITAMIN D3) 1,000 units tablet Take 5,000 Units by mouth daily   • cyanocobalamin (VITAMIN B-12) 100 mcg tablet Take by mouth daily   • ferrous sulfate 325 (65 Fe) mg tablet Take 325 mg by mouth daily with breakfast   • fexofenadine (ALLEGRA) 180 MG tablet Take 180 mg by mouth   • fluticasone (FLONASE) 50 mcg/act nasal spray SPRAY 2 SPRAYS INTO EACH NOSTRIL EVERY DAY   • ondansetron (ZOFRAN) 4 mg tablet Take 1 tablet (4 mg total) by mouth every 8 (eight) hours as needed for nausea or vomiting   • pantoprazole (PROTONIX) 40 mg tablet TAKE 1 TABLET BY MOUTH DAILY BEFORE BREAKFAST   • [DISCONTINUED] amphetamine-dextroamphetamine (ADDERALL) 10 mg tablet Take 1 tablet (10 mg total) by mouth 2 (two) times a day Max Daily Amount: 20 mg   • [DISCONTINUED] Balcoltra 0 1-20 MG-MCG(21) TABS TAKE 1 TABLET BY ORAL ROUTE EVERY DAY FOR 21 DAYS, FOLLOWED BY 7 DAYS   • [DISCONTINUED] Semaglutide-Weight Management (WEGOVY) 1 MG/0 5ML Inject 0 5 mL "(1 mg total) under the skin once a week for 28 days   • [DISCONTINUED] benzonatate (TESSALON) 200 MG capsule Take 1 capsule (200 mg total) by mouth 3 (three) times a day as needed for cough (Patient not taking: Reported on 6/12/2023)   • [DISCONTINUED] metFORMIN (GLUCOPHAGE-XR) 500 mg 24 hr tablet TAKE 1 TABLET BY ORAL ROUTE IN THE MORNING AND 2 TABLETS AT DINNER TIME EVERY DAY (Patient not taking: Reported on 5/15/2023)     Allergies   Allergen Reactions   • Sulfa Antibiotics GI Intolerance and Other (See Comments)     Abdominal pain       Immunization History   Administered Date(s) Administered   • COVID-19 PFIZER VACCINE 0 3 ML IM 04/16/2021, 05/07/2021   • DTaP / HiB 1994, 02/27/1995, 03/28/1995   • DTaP / HiB / IPV 01/04/1995, 03/10/1995, 05/12/1995, 08/26/1995, 08/26/1999   • DTaP 5 09/13/1996, 11/18/1998   • HPV Quadrivalent 05/28/2008, 08/01/2008, 11/12/2009, 02/06/2012, 04/12/2012, 07/02/2013   • Hep B, Adolescent or Pediatric 1994, 1994, 03/28/1995   • Hep B, adult 1994, 01/04/1995, 03/12/1995, 11/12/2009   • HiB 04/03/1996   • INFLUENZA 09/24/2018, 09/24/2018, 09/10/2021, 09/10/2021   • MMR 04/03/1996, 11/18/1998, 08/26/1999, 11/30/1999   • Meningococcal 07/25/2012   • Meningococcal ACWY, unspecified 05/21/2007, 07/02/2013   • Meningococcal Conjugate (MCV4O) 07/25/2012   • Meningococcal MCV4P 05/21/2007, 07/02/2013   • Meningococcal, Unknown Serogroups 07/25/2012   • OPV 1994, 01/04/1995, 02/27/1995, 03/10/1995, 03/28/1995, 09/13/1996, 11/18/1998, 08/26/1999   • Tdap 08/30/2006, 03/06/2008, 07/23/2016   • Varicella 04/03/1996, 05/23/2005, 03/06/2008, 10/18/2010       Objective     /64 (BP Location: Left arm, Patient Position: Sitting, Cuff Size: Adult)   Pulse 74   Temp 98 3 °F (36 8 °C) (Tympanic)   Resp 16   Ht 5' 7\" (1 702 m)   Wt 73 kg (161 lb)   SpO2 97%   BMI 25 22 kg/m²     Physical Exam  Vitals and nursing note reviewed     Constitutional:       " Appearance: Normal appearance  She is well-developed  HENT:      Head: Normocephalic and atraumatic  Right Ear: Tympanic membrane normal  There is no impacted cerumen  Left Ear: Tympanic membrane normal  There is no impacted cerumen  Mouth/Throat:      Mouth: Mucous membranes are moist       Pharynx: Oropharynx is clear  No oropharyngeal exudate or posterior oropharyngeal erythema  Eyes:      Pupils: Pupils are equal, round, and reactive to light  Cardiovascular:      Rate and Rhythm: Normal rate and regular rhythm  Heart sounds: Normal heart sounds  No murmur heard  No friction rub  No gallop  Pulmonary:      Effort: Pulmonary effort is normal       Breath sounds: Normal breath sounds  No wheezing, rhonchi or rales  Abdominal:      General: Bowel sounds are normal       Palpations: Abdomen is soft  Musculoskeletal:         General: Normal range of motion  Cervical back: Normal range of motion and neck supple  Right lower leg: No edema  Left lower leg: No edema  Lymphadenopathy:      Cervical: No cervical adenopathy  Skin:     General: Skin is warm  Findings: No lesion or rash  Neurological:      General: No focal deficit present  Mental Status: She is alert and oriented to person, place, and time  Mental status is at baseline  Cranial Nerves: No cranial nerve deficit     Psychiatric:         Mood and Affect: Mood normal          Behavior: Behavior normal        Katty Munoz MD

## 2023-06-12 NOTE — ASSESSMENT & PLAN NOTE
- side effect if WEGOVY  - educated on increased likelihood of symptoms with increased dose  - take zofran as needed for symptom management  - will continue to monitor

## 2023-06-12 NOTE — ASSESSMENT & PLAN NOTE
- patient was being managed by previous PCP  - prescribed balcoltra 0 1-20 mg-mcg tabs  - patient aware of side effects  - will continue to monitor

## 2023-06-12 NOTE — ASSESSMENT & PLAN NOTE
- well controlled on adderall 10mg BID  - stable with no symptoms  - continue with current medications  - will continue to monitor

## 2023-06-12 NOTE — ASSESSMENT & PLAN NOTE
- patient currently on WEGOVY 1mg  Agreeable to increase to 1 7mg  - lost 6 lbs since last appointment   Down 18 lbs since starting [facility] EVELYN GUAMAN 03/2023  - nausea as a side effect  - encouraged to continue with diet and exercise  - will continue to monitor  - plan for follow up in 1 month

## 2023-06-12 NOTE — ASSESSMENT & PLAN NOTE
- stable   Well managed on xanax 0 25mg   - continue with current medications  - will continue to monitor

## 2023-06-29 ENCOUNTER — OFFICE VISIT (OUTPATIENT)
Dept: OBGYN CLINIC | Facility: CLINIC | Age: 28
End: 2023-06-29
Payer: COMMERCIAL

## 2023-06-29 VITALS
WEIGHT: 158.4 LBS | HEIGHT: 67 IN | SYSTOLIC BLOOD PRESSURE: 112 MMHG | DIASTOLIC BLOOD PRESSURE: 72 MMHG | BODY MASS INDEX: 24.86 KG/M2

## 2023-06-29 DIAGNOSIS — Z12.4 ENCOUNTER FOR PAPANICOLAOU SMEAR FOR CERVICAL CANCER SCREENING: ICD-10-CM

## 2023-06-29 DIAGNOSIS — Z30.41 ORAL CONTRACEPTIVE PILL SURVEILLANCE: ICD-10-CM

## 2023-06-29 DIAGNOSIS — Z11.3 SCREENING EXAMINATION FOR STD (SEXUALLY TRANSMITTED DISEASE): ICD-10-CM

## 2023-06-29 DIAGNOSIS — Z01.419 ENCOUNTER FOR GYNECOLOGICAL EXAMINATION WITHOUT ABNORMAL FINDING: Primary | ICD-10-CM

## 2023-06-29 PROCEDURE — 99385 PREV VISIT NEW AGE 18-39: CPT | Performed by: NURSE PRACTITIONER

## 2023-06-29 PROCEDURE — G0124 SCREEN C/V THIN LAYER BY MD: HCPCS | Performed by: PATHOLOGY

## 2023-06-29 PROCEDURE — G0145 SCR C/V CYTO,THINLAYER,RESCR: HCPCS | Performed by: PATHOLOGY

## 2023-06-29 PROCEDURE — 87624 HPV HI-RISK TYP POOLED RSLT: CPT | Performed by: NURSE PRACTITIONER

## 2023-06-29 PROCEDURE — 87591 N.GONORRHOEAE DNA AMP PROB: CPT | Performed by: NURSE PRACTITIONER

## 2023-06-29 PROCEDURE — 87491 CHLMYD TRACH DNA AMP PROBE: CPT | Performed by: NURSE PRACTITIONER

## 2023-06-29 RX ORDER — ACETAMINOPHEN AND CODEINE PHOSPHATE 120; 12 MG/5ML; MG/5ML
1 SOLUTION ORAL DAILY
Qty: 84 TABLET | Refills: 4 | Status: SHIPPED | OUTPATIENT
Start: 2023-06-29

## 2023-06-29 NOTE — PATIENT INSTRUCTIONS
Calcium and Osteoporosis   AMBULATORY CARE:   Why calcium is important for osteoporosis:  Calcium is important for osteoporosis because calcium helps build bone mass  Osteoporosis is a long-term medical condition that causes your body to break down more bone than it makes  Your bones become weak, brittle, and more likely to fracture  Your calcium needs:   Women:      19 to 50 years: 1,000 mg    Over 50: 1,200 mg    Pregnant or breastfeeding, 19 years to 50 years: 1,000 mg    Men:      19 to 70: 1,000 mg    Over 70: 1,200 mg    Foods that are high in calcium: The following list shows the number of calcium milligrams (mg) per serving  Your dietitian or healthcare provider can help you create a balanced meal plan for your calcium needs  Dairy:      1 cup of low-fat plain yogurt (415 mg) or low-fat fruit yogurt (245 to 384 mg)    1½ ounces of shredded cheddar cheese (306 mg) or part skim mozzarella cheese (275 mg)    1 cup of skim, 2%, or whole milk (300 mg)    1 cup of cottage cheese made with 2% milk fat (138 mg)    ½ cup of frozen yogurt (103 mg)    Other foods:      1 cup of calcium-fortified orange juice (300 mg)    ½ cup of cooked luz greens (220 mg)    4 canned sardines, with bones (242 mg)    ½ cup of tofu (with added calcium) (204 mg)       How to get extra calcium:   Add powdered milk to puddings, cocoa, custard, or hot cereal     Sift powdered milk into flour when you make cakes, cookies, or breads  Use low-fat or fat-free milk instead of water in pancake mix, mashed potatoes, pudding, or hot breakfast cereal     Add low-fat or fat-free cheese to salad, soup, or pasta  Add tofu (with added calcium) to vegetable stir-rosenberg  Take calcium supplements if you cannot get enough calcium from the foods you eat  Your body can absorb the most calcium from supplements when you take 500 mg or less at one time  Do not take more than 2,500 mg of calcium supplements each day      Follow up with your doctor or dietitian as directed:  Write down your questions so you remember to ask them during your visits  © Copyright [employer] 2022 Information is for End User's use only and may not be sold, redistributed or otherwise used for commercial purposes  The above information is an  only  It is not intended as medical advice for individual conditions or treatments  Talk to your doctor, nurse or pharmacist before following any medical regimen to see if it is safe and effective for you

## 2023-06-29 NOTE — PROGRESS NOTES
Assessment / Plan    1  Encounter for gynecological examination without abnormal finding  Normal well woman exam  Pap updated  Agrees to G/C screening    2  Encounter for Papanicolaou smear for cervical cancer screening    - Liquid-based pap, screening    3  Screening examination for STD (sexually transmitted disease)    - Chlamydia/GC amplified DNA by PCR    4  Oral contraceptive pill surveillance  Given hx of migraine with aura will change to a  progesterone only pill  She prefers something shorter acting since she would like to conceive in the nearer future  - norethindrone (Ortho Micronor) 0 35 MG tablet; Take 1 tablet (0 35 mg total) by mouth daily  Dispense: 84 tablet; Refill: 4        Subjective      Brigette SHETH Jani Garcia is a 28 y o  female who presents for her annual gynecologic exam     NEW PATIENT    27 yo G0    Hx migraine with aura  Currently taking 20 mcg LESTER  Lack of sex drive 1-2 years; wonders if related to her OCP  Discussed possibility of OCP effect  2023 TSH normal    Last pap: 2020, noted in system but result not visible  Pap hx: h/o abn pap, colpo; 2016 LGSIL  STD screening:   STD hx:  chlamydia  Sexually active: yes, 8 yr relationship  Condom use: yes  Gardasil vaccine: completed  Calcium intake: 2 servings per day; given guidelines  Exercise: 3-4 times per week, walking/ weights  Domestic violence: feels safe    Periods are irregular  Current contraception: OCP (estrogen/progesterone)  History of abnormal Pap smear: yes -   Family history of breast,uterine, ovarian or colon cancer: yes - [facility] breast ca    Menstrual History:  OB History        0    Para   0    Term   0       0    AB   0    Living   0       SAB   0    IAB   0    Ectopic   0    Multiple   0    Live Births   0                  Patient's last menstrual period was 2023 (approximate)         The following portions of the patient's history were reviewed and updated as appropriate: allergies, current "medications, past family history, past medical history, past social history, past surgical history and problem list     Review of Systems      Review of Systems   Constitutional: Negative for chills and fever  Respiratory: Negative for cough and shortness of breath  Gastrointestinal: Negative for abdominal distention, abdominal pain, blood in stool, constipation, diarrhea, nausea and vomiting  Genitourinary: Negative for difficulty urinating, dysuria, frequency, genital sores, hematuria, menstrual problem, pelvic pain, urgency, vaginal bleeding and vaginal discharge  Musculoskeletal: Negative for arthralgias and myalgias  Breasts:  Negative for skin changes, dimpling, asymmetry, nipple discharge, redness, tenderness or palpable masses    Objective      /72 (BP Location: Right arm, Patient Position: Sitting, Cuff Size: Standard)   Ht 5' 7\" (1 702 m)   Wt 71 8 kg (158 lb 6 4 oz)   LMP 06/05/2023 (Approximate)   BMI 24 81 kg/m²   Physical Exam  Constitutional:       General: She is not in acute distress  Appearance: Normal appearance  She is well-developed and normal weight  She is not ill-appearing or diaphoretic  HENT:      Head: Normocephalic and atraumatic  Eyes:      Pupils: Pupils are equal, round, and reactive to light  Neck:      Thyroid: No thyromegaly  Pulmonary:      Effort: Pulmonary effort is normal    Chest:   Breasts:     Breasts are symmetrical       Right: No inverted nipple, mass, nipple discharge, skin change or tenderness  Left: No inverted nipple, mass, nipple discharge, skin change or tenderness  Abdominal:      General: There is no distension  Palpations: Abdomen is soft  There is no mass  Tenderness: There is no abdominal tenderness  There is no guarding or rebound  Genitourinary:     General: Normal vulva  Exam position: Lithotomy position  Labia:         Right: No rash, tenderness, lesion or injury           Left: No rash, " tenderness, lesion or injury  Vagina: No signs of injury and foreign body  No vaginal discharge, erythema, tenderness or bleeding  Cervix: No cervical motion tenderness, discharge or friability  Uterus: Not enlarged and not tender  Adnexa:         Right: No mass or tenderness  Left: No mass or tenderness  Musculoskeletal:      Cervical back: Neck supple  Lymphadenopathy:      Cervical: No cervical adenopathy  Upper Body:      Right upper body: No supraclavicular adenopathy  Left upper body: No supraclavicular adenopathy  Skin:     General: Skin is warm and dry  Neurological:      General: No focal deficit present  Mental Status: She is alert and oriented to person, place, and time  Psychiatric:         Mood and Affect: Mood normal          Behavior: Behavior normal          Thought Content:  Thought content normal          Judgment: Judgment normal

## 2023-07-03 LAB
C TRACH DNA SPEC QL NAA+PROBE: NEGATIVE
N GONORRHOEA DNA SPEC QL NAA+PROBE: NEGATIVE

## 2023-07-06 LAB
HPV HR 12 DNA CVX QL NAA+PROBE: POSITIVE
HPV16 DNA CVX QL NAA+PROBE: NEGATIVE
HPV18 DNA CVX QL NAA+PROBE: NEGATIVE
LAB AP GYN PRIMARY INTERPRETATION: ABNORMAL
Lab: ABNORMAL
PATH INTERP SPEC-IMP: ABNORMAL

## 2023-07-07 NOTE — RESULT ENCOUNTER NOTE
ASCUS pap with positive other HPV. Per ASCCP guidelines, recommend colposcopy. Please make sure patient schedules colposcopy.

## 2023-07-10 ENCOUNTER — OFFICE VISIT (OUTPATIENT)
Dept: FAMILY MEDICINE CLINIC | Facility: CLINIC | Age: 28
End: 2023-07-10
Payer: COMMERCIAL

## 2023-07-10 VITALS
SYSTOLIC BLOOD PRESSURE: 118 MMHG | RESPIRATION RATE: 16 BRPM | DIASTOLIC BLOOD PRESSURE: 68 MMHG | HEIGHT: 67 IN | OXYGEN SATURATION: 98 % | WEIGHT: 156 LBS | BODY MASS INDEX: 24.48 KG/M2 | HEART RATE: 76 BPM | TEMPERATURE: 97.6 F

## 2023-07-10 DIAGNOSIS — E78.2 MIXED HYPERLIPIDEMIA: Primary | ICD-10-CM

## 2023-07-10 PROCEDURE — 99213 OFFICE O/P EST LOW 20 MIN: CPT | Performed by: FAMILY MEDICINE

## 2023-07-10 NOTE — ASSESSMENT & PLAN NOTE
Improving. Discussed with patient about continue on the same dose 1.7 mg weekly. She was told she can try to use it once every 8 to 9 days. She was given refills. Continue to follow low-carb diet and regular exercise. Come back in 3 months for follow-up.

## 2023-07-10 NOTE — PROGRESS NOTES
Name: Rolando Fabry      : 1995      MRN: [id number]  Encounter Provider: Olivia George MD  Encounter Date: 7/10/2023   Encounter department: [facility]     1. Mixed hyperlipidemia  Assessment & Plan:  Stable. Continue with low-fat diet and regular exercise. Continue to monitor. 2. BMI 24.0-24.9, adult  Assessment & Plan:  Improving. Discussed with patient about continue on the same dose 1.7 mg weekly. She was told she can try to use it once every 8 to 9 days. She was given refills. Continue to follow low-carb diet and regular exercise. Come back in 3 months for follow-up. 3. BMI 25.0-25.9,adult  Assessment & Plan:  Improving. Discussed with patient about continue on the same dose 1.7 mg weekly. She was told she can try to use it once every 8 to 9 days. She was given refills. Continue to follow low-carb diet and regular exercise. Come back in 3 months for follow-up. Orders:  -     Semaglutide-Weight Management (WEGOVY) 1.7 MG/0.75ML; Inject 0.75 mL (1.7 mg total) under the skin once a week           Subjective     She is here today for follow-up for weight management. She has been using Wegovy 1.7 mg weekly. She lost 5 pounds since her last visit. She denies any side effect from the medication. She stated sometimes she will feel nauseous second day after she received her shot. Review of Systems   Constitutional: Negative for chills and fever. HENT: Negative for trouble swallowing. Eyes: Negative for visual disturbance. Respiratory: Negative for cough and shortness of breath. Cardiovascular: Negative for chest pain, palpitations and leg swelling. Gastrointestinal: Negative for abdominal pain, constipation and diarrhea. Endocrine: Negative for cold intolerance and heat intolerance. Genitourinary: Negative for difficulty urinating and dysuria. Musculoskeletal: Negative for gait problem.    Skin: Negative for rash.   Neurological: Negative for dizziness, tremors, seizures and headaches. Hematological: Negative for adenopathy. Psychiatric/Behavioral: Negative for behavioral problems. Past Medical History:   Diagnosis Date   • Abnormal Pap smear of cervix 2012    Resulted in cone biopsy. No abnormal findings; 7/2023 ASCUS with pos other HPV. Needs COLPO. • ADHD    • Anxiety    • Chlamydia Treated in November 2015   • IBS (irritable bowel syndrome)    • Migraine     Occasional; with aura     Past Surgical History:   Procedure Laterality Date   • COLPOSCOPY  2012    reportedly normal   • SINUS SURGERY       Family History   Problem Relation Age of Onset   • Stroke Father    • Transient ischemic attack Father    • ADD / ADHD Brother    • Breast cancer Maternal Grandmother         ? age   • Hodgkin's lymphoma Maternal Aunt    • Cancer Maternal Aunt         Hodgkins lymphoma   • Colon cancer Neg Hx    • Ovarian cancer Neg Hx    • Uterine cancer Neg Hx      Social History     Socioeconomic History   • Marital status: Single     Spouse name: None   • Number of children: None   • Years of education: None   • Highest education level: None   Occupational History   • None   Tobacco Use   • Smoking status: Never     Passive exposure: Never   • Smokeless tobacco: Never   • Tobacco comments:     Socially in college, never habitually. Vaping Use   • Vaping Use: Former   Substance and Sexual Activity   • Alcohol use:  Yes     Alcohol/week: 6.0 standard drinks of alcohol     Types: 2 Glasses of wine, 4 Standard drinks or equivalent per week     Comment: Typically only socially on weekenda   • Drug use: Never   • Sexual activity: Yes     Partners: Male     Birth control/protection: Coitus interruptus, OCP     Comment: current partner of 8 yrs   Other Topics Concern   • None   Social History Narrative   • None     Social Determinants of Health     Financial Resource Strain: Not on file   Food Insecurity: Not on file Transportation Needs: Not on file   Physical Activity: Not on file   Stress: Not on file   Social Connections: Not on file   Intimate Partner Violence: Not on file   Housing Stability: Not on file     Current Outpatient Medications on File Prior to Visit   Medication Sig   • ALPRAZolam (XANAX) 0.25 mg tablet Take 0.25 mg by mouth daily   • amphetamine-dextroamphetamine (ADDERALL) 10 mg tablet Take 1 tablet (10 mg total) by mouth 2 (two) times a day Max Daily Amount: 20 mg   • cholecalciferol (VITAMIN D3) 1,000 units tablet Take 5,000 Units by mouth daily   • cyanocobalamin (VITAMIN B-12) 100 mcg tablet Take by mouth daily   • ferrous sulfate 325 (65 Fe) mg tablet Take 325 mg by mouth daily with breakfast   • fexofenadine (ALLEGRA) 180 MG tablet Take 180 mg by mouth   • fluticasone (FLONASE) 50 mcg/act nasal spray SPRAY 2 SPRAYS INTO EACH NOSTRIL EVERY DAY   • norethindrone (Ortho Micronor) 0.35 MG tablet Take 1 tablet (0.35 mg total) by mouth daily   • ondansetron (ZOFRAN) 4 mg tablet Take 1 tablet (4 mg total) by mouth every 8 (eight) hours as needed for nausea or vomiting   • pantoprazole (PROTONIX) 40 mg tablet TAKE 1 TABLET BY MOUTH DAILY BEFORE BREAKFAST   • [DISCONTINUED] Semaglutide-Weight Management (WEGOVY) 1.7 MG/0.75ML Inject 0.75 mL (1.7 mg total) under the skin once a week     Allergies   Allergen Reactions   • Sulfa Antibiotics GI Intolerance and Other (See Comments)     Abdominal pain       Immunization History   Administered Date(s) Administered   • COVID-19 PFIZER VACCINE 0.3 ML IM 04/16/2021, 05/07/2021   • DTaP / HiB 1994, 02/27/1995, 03/28/1995   • DTaP / HiB / IPV 01/04/1995, 03/10/1995, 05/12/1995, 08/26/1995, 08/26/1999   • DTaP 5 09/13/1996, 11/18/1998   • HPV Quadrivalent 05/28/2008, 08/01/2008, 11/12/2009, 02/06/2012, 04/12/2012, 07/02/2013   • Hep B, Adolescent or Pediatric 1994, 1994, 03/28/1995   • Hep B, adult 1994, 01/04/1995, 03/12/1995, 11/12/2009   • HiB 04/03/1996   • INFLUENZA 09/24/2018, 09/24/2018, 09/10/2021, 09/10/2021   • MMR 04/03/1996, 11/18/1998, 08/26/1999, 11/30/1999   • Meningococcal 07/25/2012   • Meningococcal ACWY, unspecified 05/21/2007, 07/02/2013   • Meningococcal Conjugate (MCV4O) 07/25/2012   • Meningococcal MCV4P 05/21/2007, 07/02/2013   • Meningococcal, Unknown Serogroups 07/25/2012   • OPV 1994, 01/04/1995, 02/27/1995, 03/10/1995, 03/28/1995, 09/13/1996, 11/18/1998, 08/26/1999   • Tdap 08/30/2006, 03/06/2008, 07/23/2016   • Varicella 04/03/1996, 05/23/2005, 03/06/2008, 10/18/2010       Objective     /68 (BP Location: Left arm, Patient Position: Sitting, Cuff Size: Standard)   Pulse 76   Temp 97.6 °F (36.4 °C) (Tympanic)   Resp 16   Ht 5' 7" (1.702 m)   Wt 70.8 kg (156 lb)   LMP 06/05/2023 (Approximate)   SpO2 98%   BMI 24.43 kg/m²     Physical Exam  Vitals and nursing note reviewed. Constitutional:       Appearance: She is well-developed. HENT:      Head: Normocephalic and atraumatic. Eyes:      Pupils: Pupils are equal, round, and reactive to light. Cardiovascular:      Rate and Rhythm: Normal rate and regular rhythm. Heart sounds: Normal heart sounds. Pulmonary:      Effort: Pulmonary effort is normal.      Breath sounds: Normal breath sounds. Abdominal:      General: Bowel sounds are normal.      Palpations: Abdomen is soft. Musculoskeletal:         General: Normal range of motion. Cervical back: Normal range of motion and neck supple. Lymphadenopathy:      Cervical: No cervical adenopathy. Skin:     General: Skin is warm. Neurological:      Mental Status: She is alert and oriented to person, place, and time. Cranial Nerves: No cranial nerve deficit.        Agustin lBair MD

## 2023-07-13 ENCOUNTER — PATIENT MESSAGE (OUTPATIENT)
Dept: OBGYN CLINIC | Facility: CLINIC | Age: 28
End: 2023-07-13

## 2023-07-14 PROBLEM — J01.00 ACUTE NON-RECURRENT MAXILLARY SINUSITIS: Status: RESOLVED | Noted: 2023-05-15 | Resolved: 2023-07-14

## 2023-07-17 ENCOUNTER — TELEPHONE (OUTPATIENT)
Dept: FAMILY MEDICINE CLINIC | Facility: CLINIC | Age: 28
End: 2023-07-17

## 2023-07-17 NOTE — TELEPHONE ENCOUNTER
----- Message from Myesha Ingram sent at 7/17/2023 10:24 AM EDT -----  Regarding: Possible Side Effect Question  Contact: [phone]  Hi Dr. Deon Kilgore,    I am wondering if something I’m experiencing is related to the [facility]. The last two weeks now I have been a bit more constipated which isn’t unusual with my IBS, but I have noticed what appears to be mucus on my stool after a bowel movement. This isn’t something Jim Heredia noticed before and I was wondering if it is normal or will subside. I currently take fiber and probiotics but it hasn’t changed so far. Thanks!

## 2023-07-18 DIAGNOSIS — K59.09 OTHER CONSTIPATION: Primary | ICD-10-CM

## 2023-07-18 NOTE — TELEPHONE ENCOUNTER
It is a possible side effect of the [facility] DENIZ. I sent a prescription for milk of magnesia. Can try to see if it helps more with her bowel movement.

## 2023-10-11 ENCOUNTER — OFFICE VISIT (OUTPATIENT)
Dept: FAMILY MEDICINE CLINIC | Facility: CLINIC | Age: 28
End: 2023-10-11
Payer: COMMERCIAL

## 2023-10-11 VITALS
BODY MASS INDEX: 22.03 KG/M2 | HEIGHT: 67 IN | RESPIRATION RATE: 16 BRPM | SYSTOLIC BLOOD PRESSURE: 104 MMHG | WEIGHT: 140.4 LBS | HEART RATE: 77 BPM | TEMPERATURE: 97.4 F | OXYGEN SATURATION: 98 % | DIASTOLIC BLOOD PRESSURE: 62 MMHG

## 2023-10-11 DIAGNOSIS — E78.2 MIXED HYPERLIPIDEMIA: ICD-10-CM

## 2023-10-11 DIAGNOSIS — K21.9 GASTROESOPHAGEAL REFLUX DISEASE WITHOUT ESOPHAGITIS: ICD-10-CM

## 2023-10-11 DIAGNOSIS — F90.2 ATTENTION DEFICIT HYPERACTIVITY DISORDER (ADHD), COMBINED TYPE: Primary | ICD-10-CM

## 2023-10-11 PROBLEM — R14.0 ABDOMINAL BLOATING: Status: RESOLVED | Noted: 2018-03-30 | Resolved: 2023-10-11

## 2023-10-11 PROBLEM — J02.9 SORE THROAT: Status: RESOLVED | Noted: 2023-04-02 | Resolved: 2023-10-11

## 2023-10-11 PROCEDURE — 99214 OFFICE O/P EST MOD 30 MIN: CPT | Performed by: FAMILY MEDICINE

## 2023-10-11 RX ORDER — DEXTROAMPHETAMINE SACCHARATE, AMPHETAMINE ASPARTATE, DEXTROAMPHETAMINE SULFATE AND AMPHETAMINE SULFATE 2.5; 2.5; 2.5; 2.5 MG/1; MG/1; MG/1; MG/1
10 TABLET ORAL
Qty: 60 TABLET | Refills: 0 | Status: SHIPPED | OUTPATIENT
Start: 2023-10-11

## 2023-10-11 RX ORDER — DROSPIRENONE 4 MG/1
1 TABLET, FILM COATED ORAL DAILY
COMMUNITY
Start: 2023-07-17

## 2023-10-11 NOTE — ASSESSMENT & PLAN NOTE
Improving. Discussed with patient about cutting down on Wegovy and continue to watch her diet. Continue with lifestyle changes. We will continue to monitor. Come back in 3 months.

## 2023-10-11 NOTE — PROGRESS NOTES
Name: Emilia Mendes      : 1995      MRN: [id number]  Encounter Provider: Stefano Sen MD  Encounter Date: 10/11/2023   Encounter department: [facility]     1. Attention deficit hyperactivity disorder (ADHD), combined type  Assessment & Plan:  Doing well on Adderall. Continue same. We will continue to monitor. Orders:  -     amphetamine-dextroamphetamine (ADDERALL) 10 mg tablet; Take 1 tablet (10 mg total) by mouth 2 (two) times a day Max Daily Amount: 20 mg    2. BMI 25.0-25.9,adult  Assessment & Plan:  Improving. Discussed with patient about cutting down on Wegovy and continue to watch her diet. Continue with lifestyle changes. We will continue to monitor. Come back in 3 months. Orders:  -     Semaglutide-Weight Management (WEGOVY) 1.7 MG/0.75ML; Inject 0.75 mL (1.7 mg total) under the skin once a week    3. Mixed hyperlipidemia  Assessment & Plan:  Stable. Continue on low-fat diet and regular exercise. Continue to monitor fasting lipid profile. Orders:  -     CBC and differential; Future  -     Comprehensive metabolic panel; Future  -     Lipid Panel with Direct LDL reflex; Future  -     TSH, 3rd generation with Free T4 reflex; Future    4. Gastroesophageal reflux disease without esophagitis  Assessment & Plan:  She is doing well on pantoprazole. Continue same. We will continue to monitor. 5. BMI 21.0-21.9, adult  Assessment & Plan:  Improving. Discussed with patient about cutting down on Wegovy and continue to watch her diet. Continue with lifestyle changes. We will continue to monitor. Come back in 3 months. Subjective     She is here today for follow-up multiple medical problems patient been taking her medications previously side effects. She has been using her Wegovy every other week and she lost 16 pounds since her last visit.   She has been taking Adderall and she is doing well on the medication. Review of Systems   Constitutional:  Negative for chills and fever. HENT:  Negative for trouble swallowing. Eyes:  Negative for visual disturbance. Respiratory:  Negative for cough and shortness of breath. Cardiovascular:  Negative for chest pain, palpitations and leg swelling. Gastrointestinal:  Negative for abdominal pain, constipation and diarrhea. Endocrine: Negative for cold intolerance and heat intolerance. Genitourinary:  Negative for difficulty urinating and dysuria. Musculoskeletal:  Negative for gait problem. Skin:  Negative for rash. Neurological:  Negative for dizziness, tremors, seizures and headaches. Hematological:  Negative for adenopathy. Psychiatric/Behavioral:  Negative for behavioral problems. Past Medical History:   Diagnosis Date   • Abnormal Pap smear of cervix 2012    Resulted in cone biopsy. No abnormal findings; 7/2023 ASCUS with pos other HPV. Needs COLPO. • ADHD    • Anxiety    • Chlamydia Treated in November 2015   • IBS (irritable bowel syndrome)    • Migraine     Occasional; with aura     Past Surgical History:   Procedure Laterality Date   • COLPOSCOPY  2012    reportedly normal   • SINUS SURGERY       Family History   Problem Relation Age of Onset   • Stroke Father    • Transient ischemic attack Father    • ADD / ADHD Brother    • Breast cancer Maternal Grandmother         ? age   • Hodgkin's lymphoma Maternal Aunt    • Cancer Maternal Aunt         Hodgkins lymphoma   • Colon cancer Neg Hx    • Ovarian cancer Neg Hx    • Uterine cancer Neg Hx      Social History     Socioeconomic History   • Marital status: Single     Spouse name: None   • Number of children: None   • Years of education: None   • Highest education level: None   Occupational History   • None   Tobacco Use   • Smoking status: Never     Passive exposure: Never   • Smokeless tobacco: Never   • Tobacco comments:     Socially in college, never habitually.    Vaping Use • Vaping Use: Former   Substance and Sexual Activity   • Alcohol use:  Yes     Alcohol/week: 6.0 standard drinks of alcohol     Types: 2 Glasses of wine, 4 Standard drinks or equivalent per week     Comment: Typically only socially on weekenda   • Drug use: Never   • Sexual activity: Yes     Partners: Male     Birth control/protection: Coitus interruptus, OCP     Comment: current partner of 8 yrs   Other Topics Concern   • None   Social History Narrative   • None     Social Determinants of Health     Financial Resource Strain: Not on file   Food Insecurity: Not on file   Transportation Needs: Not on file   Physical Activity: Not on file   Stress: Not on file   Social Connections: Not on file   Intimate Partner Violence: Not on file   Housing Stability: Not on file     Current Outpatient Medications on File Prior to Visit   Medication Sig   • ALPRAZolam (XANAX) 0.25 mg tablet Take 0.25 mg by mouth daily   • cholecalciferol (VITAMIN D3) 1,000 units tablet Take 5,000 Units by mouth daily   • cyanocobalamin (VITAMIN B-12) 100 mcg tablet Take by mouth daily   • ferrous sulfate 325 (65 Fe) mg tablet Take 325 mg by mouth daily with breakfast   • fexofenadine (ALLEGRA) 180 MG tablet Take 180 mg by mouth   • fluticasone (FLONASE) 50 mcg/act nasal spray SPRAY 2 SPRAYS INTO EACH NOSTRIL EVERY DAY   • ondansetron (ZOFRAN) 4 mg tablet Take 1 tablet (4 mg total) by mouth every 8 (eight) hours as needed for nausea or vomiting   • pantoprazole (PROTONIX) 40 mg tablet TAKE 1 TABLET BY MOUTH DAILY BEFORE BREAKFAST   • Slynd 4 MG TABS Take 1 tablet by mouth daily   • [DISCONTINUED] amphetamine-dextroamphetamine (ADDERALL) 10 mg tablet Take 1 tablet (10 mg total) by mouth 2 (two) times a day Max Daily Amount: 20 mg   • [DISCONTINUED] Semaglutide-Weight Management (WEGOVY) 1.7 MG/0.75ML Inject 0.75 mL (1.7 mg total) under the skin once a week   • [DISCONTINUED] magnesium hydroxide (MILK OF MAGNESIA) 400 mg/5 mL oral suspension Take 15 mL by mouth daily as needed for constipation   • [DISCONTINUED] norethindrone (Ortho Micronor) 0.35 MG tablet Take 1 tablet (0.35 mg total) by mouth daily     Allergies   Allergen Reactions   • Sulfa Antibiotics GI Intolerance and Other (See Comments)     Abdominal pain       Immunization History   Administered Date(s) Administered   • COVID-19 PFIZER VACCINE 0.3 ML IM 04/16/2021, 05/07/2021   • DTaP / HiB 1994, 02/27/1995, 03/28/1995   • DTaP / HiB / IPV 01/04/1995, 03/10/1995, 05/12/1995, 08/26/1995, 08/26/1999   • DTaP 5 09/13/1996, 11/18/1998   • HPV Quadrivalent 05/28/2008, 08/01/2008, 11/12/2009, 02/06/2012, 04/12/2012, 07/02/2013   • Hep B, Adolescent or Pediatric 1994, 1994, 03/28/1995   • Hep B, adult 1994, 01/04/1995, 03/12/1995, 11/12/2009   • HiB 04/03/1996   • INFLUENZA 09/24/2018, 09/24/2018, 09/10/2021, 09/10/2021   • MMR 04/03/1996, 11/18/1998, 08/26/1999, 11/30/1999   • Meningococcal 07/25/2012   • Meningococcal ACWY, unspecified 05/21/2007, 07/02/2013   • Meningococcal Conjugate (MCV4O) 07/25/2012   • Meningococcal MCV4P 05/21/2007, 07/02/2013   • Meningococcal, Unknown Serogroups 07/25/2012   • OPV 1994, 01/04/1995, 02/27/1995, 03/10/1995, 03/28/1995, 09/13/1996, 11/18/1998, 08/26/1999   • Tdap 08/30/2006, 03/06/2008, 07/23/2016   • Varicella 04/03/1996, 05/23/2005, 03/06/2008, 10/18/2010       Objective     /62 (BP Location: Left arm, Patient Position: Sitting, Cuff Size: Standard)   Pulse 77   Temp (!) 97.4 °F (36.3 °C) (Tympanic)   Resp 16   Ht 5' 7" (1.702 m)   Wt 63.7 kg (140 lb 6.4 oz)   SpO2 98%   BMI 21.99 kg/m²     Physical Exam  Vitals and nursing note reviewed. Constitutional:       Appearance: She is well-developed. HENT:      Head: Normocephalic and atraumatic. Eyes:      Pupils: Pupils are equal, round, and reactive to light. Cardiovascular:      Rate and Rhythm: Normal rate and regular rhythm. Heart sounds: Normal heart sounds. Pulmonary:      Effort: Pulmonary effort is normal.      Breath sounds: Normal breath sounds. Abdominal:      General: Bowel sounds are normal.      Palpations: Abdomen is soft. Musculoskeletal:      Cervical back: Normal range of motion and neck supple. Lymphadenopathy:      Cervical: No cervical adenopathy. Skin:     General: Skin is warm. Neurological:      Mental Status: She is alert and oriented to person, place, and time.        Duglas Millan MD

## 2023-11-01 ENCOUNTER — PATIENT MESSAGE (OUTPATIENT)
Dept: FAMILY MEDICINE CLINIC | Facility: CLINIC | Age: 28
End: 2023-11-01

## 2023-11-01 ENCOUNTER — TELEPHONE (OUTPATIENT)
Dept: FAMILY MEDICINE CLINIC | Facility: CLINIC | Age: 28
End: 2023-11-01

## 2023-11-01 NOTE — TELEPHONE ENCOUNTER
----- Message from Myesha Stokes sent at 11/1/2023  1:28 PM EDT -----  Regarding: Swollen lymph nodes 2 weeks after Covid  Contact: [phone]  Hi Dr. Jesus Gordon,     I had Covid not last week but the previous, and it lasted around 10 days. Now it’s been about 10 days since I tested negative and I’m still experiencing swollen lymph nodes in my neck and under my chin and some overall puffiness. I wanted to confirm this was normal, and if I should just continue to take nsaids or something more specific? Thanks!

## 2023-11-02 NOTE — TELEPHONE ENCOUNTER
Continue with symptomatic treatment and if symptoms continue for 3-4 more weeks come in for an appointment.

## 2023-11-02 NOTE — TELEPHONE ENCOUNTER
LM for pt to call office back  Will send message back through [employer] to pt under the patient message encounter 11/1/23

## 2023-12-13 ENCOUNTER — TELEPHONE (OUTPATIENT)
Dept: FAMILY MEDICINE CLINIC | Facility: CLINIC | Age: 28
End: 2023-12-13

## 2023-12-13 NOTE — TELEPHONE ENCOUNTER
----- Message from Myesha Medina sent at 12/13/2023 10:53 AM EST -----  Regarding: Reimbursement form for Contraception  Contact: [phone]  Mary! I was looking to submit a claim to my insurance for reimbursement for [employer]. I will need a form from my physician. I’ve attached the info and form here for me to submit, let me know if you have any questions or if you need any other info from me. Thanks!

## 2024-01-31 ENCOUNTER — OFFICE VISIT (OUTPATIENT)
Dept: FAMILY MEDICINE CLINIC | Facility: CLINIC | Age: 29
End: 2024-01-31
Payer: COMMERCIAL

## 2024-01-31 VITALS
BODY MASS INDEX: 21.5 KG/M2 | HEART RATE: 74 BPM | OXYGEN SATURATION: 98 % | TEMPERATURE: 97.5 F | DIASTOLIC BLOOD PRESSURE: 62 MMHG | HEIGHT: 67 IN | WEIGHT: 137 LBS | SYSTOLIC BLOOD PRESSURE: 100 MMHG | RESPIRATION RATE: 16 BRPM

## 2024-01-31 DIAGNOSIS — F90.2 ATTENTION DEFICIT HYPERACTIVITY DISORDER (ADHD), COMBINED TYPE: ICD-10-CM

## 2024-01-31 DIAGNOSIS — K21.9 GASTROESOPHAGEAL REFLUX DISEASE WITHOUT ESOPHAGITIS: ICD-10-CM

## 2024-01-31 DIAGNOSIS — J30.89 SEASONAL ALLERGIC RHINITIS DUE TO OTHER ALLERGIC TRIGGER: ICD-10-CM

## 2024-01-31 DIAGNOSIS — Z00.00 HEALTHCARE MAINTENANCE: ICD-10-CM

## 2024-01-31 DIAGNOSIS — F90.9 ATTENTION DEFICIT HYPERACTIVITY DISORDER (ADHD), UNSPECIFIED ADHD TYPE: Primary | ICD-10-CM

## 2024-01-31 DIAGNOSIS — E78.2 MIXED HYPERLIPIDEMIA: ICD-10-CM

## 2024-01-31 PROCEDURE — 99214 OFFICE O/P EST MOD 30 MIN: CPT | Performed by: FAMILY MEDICINE

## 2024-01-31 PROCEDURE — 99395 PREV VISIT EST AGE 18-39: CPT | Performed by: FAMILY MEDICINE

## 2024-01-31 PROCEDURE — 3725F SCREEN DEPRESSION PERFORMED: CPT | Performed by: FAMILY MEDICINE

## 2024-01-31 RX ORDER — DEXTROAMPHETAMINE SACCHARATE, AMPHETAMINE ASPARTATE, DEXTROAMPHETAMINE SULFATE AND AMPHETAMINE SULFATE 2.5; 2.5; 2.5; 2.5 MG/1; MG/1; MG/1; MG/1
10 TABLET ORAL
Qty: 60 TABLET | Refills: 0 | Status: SHIPPED | OUTPATIENT
Start: 2024-01-31

## 2024-01-31 NOTE — ASSESSMENT & PLAN NOTE
Stable on Adderall.  Continue same and we will continue to monitor.  Come back in 6 months for follow-up.

## 2024-01-31 NOTE — ASSESSMENT & PLAN NOTE
Well-controlled without medication.  Continue to follow low-fat diet.  We will continue to monitor fasting lipid profile.

## 2024-02-21 PROBLEM — Z00.00 HEALTHCARE MAINTENANCE: Status: RESOLVED | Noted: 2024-01-31 | Resolved: 2024-02-21

## 2024-03-05 LAB
ALBUMIN SERPL-MCNC: 4.3 G/DL (ref 3.6–5.1)
ALBUMIN/GLOB SERPL: 1.8 (CALC) (ref 1–2.5)
ALP SERPL-CCNC: 54 U/L (ref 31–125)
ALT SERPL-CCNC: 33 U/L (ref 6–29)
AST SERPL-CCNC: 20 U/L (ref 10–30)
BASOPHILS # BLD AUTO: 91 CELLS/UL (ref 0–200)
BASOPHILS NFR BLD AUTO: 1.3 %
BILIRUB SERPL-MCNC: 0.3 MG/DL (ref 0.2–1.2)
BUN SERPL-MCNC: 12 MG/DL (ref 7–25)
BUN/CREAT SERPL: ABNORMAL (CALC) (ref 6–22)
CALCIUM SERPL-MCNC: 9.6 MG/DL (ref 8.6–10.2)
CHLORIDE SERPL-SCNC: 106 MMOL/L (ref 98–110)
CHOLEST SERPL-MCNC: 171 MG/DL
CHOLEST/HDLC SERPL: 2.9 (CALC)
CO2 SERPL-SCNC: 26 MMOL/L (ref 20–32)
CREAT SERPL-MCNC: 0.61 MG/DL (ref 0.5–0.96)
EOSINOPHIL # BLD AUTO: 917 CELLS/UL (ref 15–500)
EOSINOPHIL NFR BLD AUTO: 13.1 %
ERYTHROCYTE [DISTWIDTH] IN BLOOD BY AUTOMATED COUNT: 11.8 % (ref 11–15)
GFR/BSA.PRED SERPLBLD CYS-BASED-ARV: 124 ML/MIN/1.73M2
GLOBULIN SER CALC-MCNC: 2.4 G/DL (CALC) (ref 1.9–3.7)
GLUCOSE SERPL-MCNC: 74 MG/DL (ref 65–99)
HCT VFR BLD AUTO: 39.6 % (ref 35–45)
HDLC SERPL-MCNC: 59 MG/DL
HGB BLD-MCNC: 13.4 G/DL (ref 11.7–15.5)
LDLC SERPL CALC-MCNC: 98 MG/DL (CALC)
LYMPHOCYTES # BLD AUTO: 2695 CELLS/UL (ref 850–3900)
LYMPHOCYTES NFR BLD AUTO: 38.5 %
MCH RBC QN AUTO: 32.1 PG (ref 27–33)
MCHC RBC AUTO-ENTMCNC: 33.8 G/DL (ref 32–36)
MCV RBC AUTO: 95 FL (ref 80–100)
MONOCYTES # BLD AUTO: 392 CELLS/UL (ref 200–950)
MONOCYTES NFR BLD AUTO: 5.6 %
NEUTROPHILS # BLD AUTO: 2905 CELLS/UL (ref 1500–7800)
NEUTROPHILS NFR BLD AUTO: 41.5 %
NONHDLC SERPL-MCNC: 112 MG/DL (CALC)
PLATELET # BLD AUTO: 345 THOUSAND/UL (ref 140–400)
PMV BLD REES-ECKER: 10.1 FL (ref 7.5–12.5)
POTASSIUM SERPL-SCNC: 4.5 MMOL/L (ref 3.5–5.3)
PROT SERPL-MCNC: 6.7 G/DL (ref 6.1–8.1)
RBC # BLD AUTO: 4.17 MILLION/UL (ref 3.8–5.1)
SODIUM SERPL-SCNC: 139 MMOL/L (ref 135–146)
TRIGL SERPL-MCNC: 56 MG/DL
TSH SERPL-ACNC: 1.43 MIU/L
WBC # BLD AUTO: 7 THOUSAND/UL (ref 3.8–10.8)

## 2024-03-06 ENCOUNTER — TELEPHONE (OUTPATIENT)
Dept: FAMILY MEDICINE CLINIC | Facility: CLINIC | Age: 29
End: 2024-03-06

## 2024-03-06 NOTE — TELEPHONE ENCOUNTER
----- Message from Chanelle Diez sent at 3/6/2024  9:43 AM EST -----  Regarding: FW: Wegovy  Contact: [phone]  Question about Wegovy dosing.  ----- Message -----  From: Reyes, Brittney D  Sent: 3/6/2024   8:32 AM EST  To: [facility]  Subject: Wegovy                                           Hi Dr. Morrow,     I was thinking of using a Wegovy injection I still have on-hand, but it has been two months since my last dose at the beginning of January. The last time I used one was after one month off, and I was very sick for a few days after. I had concerns about continuing after 2 months at the same 1.7 dose I was on previously. Would it be advisable to start back up at a lower dose to avoid side effects?

## 2024-03-18 ENCOUNTER — OFFICE VISIT (OUTPATIENT)
Dept: FAMILY MEDICINE CLINIC | Facility: CLINIC | Age: 29
End: 2024-03-18
Payer: COMMERCIAL

## 2024-03-18 VITALS
RESPIRATION RATE: 16 BRPM | DIASTOLIC BLOOD PRESSURE: 60 MMHG | WEIGHT: 143 LBS | BODY MASS INDEX: 22.44 KG/M2 | SYSTOLIC BLOOD PRESSURE: 108 MMHG | TEMPERATURE: 98.2 F | HEART RATE: 81 BPM | OXYGEN SATURATION: 98 % | HEIGHT: 67 IN

## 2024-03-18 DIAGNOSIS — F90.2 ATTENTION DEFICIT HYPERACTIVITY DISORDER (ADHD), COMBINED TYPE: Primary | ICD-10-CM

## 2024-03-18 PROCEDURE — 99213 OFFICE O/P EST LOW 20 MIN: CPT | Performed by: FAMILY MEDICINE

## 2024-03-18 RX ORDER — DEXTROAMPHETAMINE SACCHARATE, AMPHETAMINE ASPARTATE, DEXTROAMPHETAMINE SULFATE AND AMPHETAMINE SULFATE 2.5; 2.5; 2.5; 2.5 MG/1; MG/1; MG/1; MG/1
10 TABLET ORAL
Qty: 60 TABLET | Refills: 0 | Status: CANCELLED | OUTPATIENT
Start: 2024-03-18

## 2024-03-18 RX ORDER — DEXTROAMPHETAMINE SACCHARATE, AMPHETAMINE ASPARTATE, DEXTROAMPHETAMINE SULFATE AND AMPHETAMINE SULFATE 2.5; 2.5; 2.5; 2.5 MG/1; MG/1; MG/1; MG/1
10 TABLET ORAL
Qty: 60 TABLET | Refills: 0 | Status: SHIPPED | OUTPATIENT
Start: 2024-03-18

## 2024-03-18 NOTE — PROGRESS NOTES
Name: Brittney D Reyes      : 1995      MRN: [id number]  Encounter Provider: Denys Morrow MD  Encounter Date: 3/18/2024   Encounter department: [facility]    Assessment & Plan     1. Attention deficit hyperactivity disorder (ADHD), combined type  Assessment & Plan:  Stable on Adderall.  Continue same.  Will continue to monitor.    Orders:  -     amphetamine-dextroamphetamine (ADDERALL) 10 mg tablet; Take 1 tablet (10 mg total) by mouth 2 (two) times a day Max Daily Amount: 20 mg    2. BMI 22.0-22.9, adult  Assessment & Plan:  Well-controlled.  I am going to cut down Wegovy to 1 mg weekly.  Discussed with possible side effects.  Continue to follow low-carb diet and increase protein in her diet.    Orders:  -     Semaglutide-Weight Management (WEGOVY) 1 MG/0.5ML; Inject 0.5 mL (1 mg total) under the skin once a week           Subjective     She is here today for follow-up multiple medical problems.  She continues to take her Adderall for her ADHD and she is doing well on the medication.  She is doing well on Wegovy for her weight management but she wants to cut down the dose to only maintain her weight.  Her blood work came back stable.  She denies any other complaint.      Review of Systems   Constitutional:  Negative for chills and fever.   HENT:  Negative for trouble swallowing.    Eyes:  Negative for visual disturbance.   Respiratory:  Negative for cough and shortness of breath.    Cardiovascular:  Negative for chest pain, palpitations and leg swelling.   Gastrointestinal:  Negative for abdominal pain, constipation and diarrhea.   Endocrine: Negative for cold intolerance and heat intolerance.   Genitourinary:  Negative for difficulty urinating and dysuria.   Musculoskeletal:  Negative for gait problem.   Skin:  Negative for rash.   Neurological:  Negative for dizziness, tremors, seizures and headaches.   Hematological:  Negative for adenopathy.   Psychiatric/Behavioral:   Negative for behavioral problems.        Past Medical History:   Diagnosis Date   • Abnormal Pap smear of cervix 2012    Resulted in cone biopsy. No abnormal findings; 7/2023 ASCUS with pos other HPV.  Needs COLPO.   • ADHD    • Anxiety    • Chlamydia Treated in November 2015   • IBS (irritable bowel syndrome)    • Migraine     Occasional; with aura     Past Surgical History:   Procedure Laterality Date   • COLPOSCOPY  2012    reportedly normal   • SINUS SURGERY       Family History   Problem Relation Age of Onset   • Stroke Father    • Transient ischemic attack Father    • ADD / ADHD Brother    • Breast cancer Maternal Grandmother         ? age   • Hodgkin's lymphoma Maternal Aunt    • Cancer Maternal Aunt         Hodgkins lymphoma   • Colon cancer Neg Hx    • Ovarian cancer Neg Hx    • Uterine cancer Neg Hx      Social History     Socioeconomic History   • Marital status: Single     Spouse name: None   • Number of children: None   • Years of education: None   • Highest education level: None   Occupational History   • None   Tobacco Use   • Smoking status: Never     Passive exposure: Never   • Smokeless tobacco: Never   • Tobacco comments:     Socially in college, never habitually.   Vaping Use   • Vaping status: Former   Substance and Sexual Activity   • Alcohol use: Yes     Alcohol/week: 6.0 standard drinks of alcohol     Types: 2 Glasses of wine, 4 Standard drinks or equivalent per week     Comment: Typically only socially on weekenda   • Drug use: Never   • Sexual activity: Yes     Partners: Male     Birth control/protection: Coitus interruptus, OCP     Comment: current partner of 8 yrs   Other Topics Concern   • None   Social History Narrative   • None     Social Determinants of Health     Financial Resource Strain: Not on file   Food Insecurity: Not on file   Transportation Needs: Not on file   Physical Activity: Not on file   Stress: Not on file   Social Connections: Not on file   Intimate Partner Violence:  Not on file   Housing Stability: Not on file     Current Outpatient Medications on File Prior to Visit   Medication Sig   • ALPRAZolam (XANAX) 0.25 mg tablet Take 0.25 mg by mouth daily   • cyanocobalamin (VITAMIN B-12) 100 mcg tablet Take by mouth daily   • ferrous sulfate 325 (65 Fe) mg tablet Take 325 mg by mouth daily with breakfast   • fexofenadine (ALLEGRA) 180 MG tablet Take 180 mg by mouth   • fluticasone (FLONASE) 50 mcg/act nasal spray SPRAY 2 SPRAYS INTO EACH NOSTRIL EVERY DAY   • [DISCONTINUED] amphetamine-dextroamphetamine (ADDERALL) 10 mg tablet Take 1 tablet (10 mg total) by mouth 2 (two) times a day Max Daily Amount: 20 mg   • cholecalciferol (VITAMIN D3) 1,000 units tablet Take 5,000 Units by mouth daily (Patient not taking: Reported on 3/18/2024)   • ondansetron (ZOFRAN) 4 mg tablet Take 1 tablet (4 mg total) by mouth every 8 (eight) hours as needed for nausea or vomiting (Patient not taking: Reported on 3/18/2024)   • pantoprazole (PROTONIX) 40 mg tablet TAKE 1 TABLET BY MOUTH DAILY BEFORE BREAKFAST (Patient not taking: Reported on 3/18/2024)   • [DISCONTINUED] Semaglutide-Weight Management (WEGOVY) 1.7 MG/0.75ML Inject 0.75 mL (1.7 mg total) under the skin once a week (Patient not taking: Reported on 3/18/2024)     Allergies   Allergen Reactions   • Sulfa Antibiotics GI Intolerance and Other (See Comments)     Abdominal pain       Immunization History   Administered Date(s) Administered   • COVID-19 PFIZER VACCINE 0.3 ML IM 04/16/2021, 05/07/2021   • DTaP / HiB 1994, 02/27/1995, 03/28/1995   • DTaP / HiB / IPV 01/04/1995, 03/10/1995, 05/12/1995, 08/26/1995, 08/26/1999   • DTaP 5 09/13/1996, 11/18/1998   • HPV Quadrivalent 05/28/2008, 08/01/2008, 11/12/2009, 02/06/2012, 04/12/2012, 07/02/2013   • Hep B, Adolescent or Pediatric 1995, 1995, 03/28/1995   • Hep B, adult 1994, 01/04/1995, 03/12/1995, 11/12/2009   • HiB 04/03/1996   • INFLUENZA 09/24/2018, 09/24/2018, 09/10/2021,  "09/10/2021, 09/05/2023   • MMR 04/03/1996, 11/18/1998, 08/26/1999, 11/30/1999   • Meningococcal 07/25/2012   • Meningococcal ACWY, unspecified 05/21/2007, 07/02/2013   • Meningococcal Conjugate (MCV4O) 07/25/2012   • Meningococcal MCV4P 05/21/2007, 07/02/2013   • Meningococcal, Unknown Serogroups 07/25/2012   • OPV 1994, 01/04/1995, 02/27/1995, 03/10/1995, 03/28/1995, 09/13/1996, 11/18/1998, 08/26/1999   • Tdap 08/30/2006, 03/06/2008, 07/23/2016   • Varicella 04/03/1996, 05/23/2005, 03/06/2008, 10/18/2010       Objective     /60 (BP Location: Left arm, Patient Position: Sitting, Cuff Size: Adult)   Pulse 81   Temp 98.2 °F (36.8 °C) (Tympanic)   Resp 16   Ht 5' 7\" (1.702 m)   Wt 64.9 kg (143 lb)   SpO2 98%   BMI 22.40 kg/m²     Physical Exam  Vitals and nursing note reviewed.   Constitutional:       Appearance: She is well-developed.   HENT:      Head: Normocephalic and atraumatic.   Eyes:      Pupils: Pupils are equal, round, and reactive to light.   Cardiovascular:      Rate and Rhythm: Normal rate and regular rhythm.      Heart sounds: Normal heart sounds.   Pulmonary:      Effort: Pulmonary effort is normal.      Breath sounds: Normal breath sounds.   Abdominal:      General: Bowel sounds are normal.      Palpations: Abdomen is soft.   Musculoskeletal:      Cervical back: Normal range of motion and neck supple.   Lymphadenopathy:      Cervical: No cervical adenopathy.   Skin:     General: Skin is warm.   Neurological:      Mental Status: She is alert and oriented to person, place, and time.       Denys Morrow MD    "

## 2024-03-18 NOTE — ASSESSMENT & PLAN NOTE
Well-controlled.  I am going to cut down Wegovy to 1 mg weekly.  Discussed with possible side effects.  Continue to follow low-carb diet and increase protein in her diet.

## 2024-05-20 RX ORDER — SEMAGLUTIDE 1 MG/.5ML
INJECTION, SOLUTION SUBCUTANEOUS
Qty: 2 ML | Refills: 0 | Status: SHIPPED | OUTPATIENT
Start: 2024-05-20

## 2024-05-23 ENCOUNTER — TELEPHONE (OUTPATIENT)
Age: 29
End: 2024-05-23

## 2024-05-23 NOTE — TELEPHONE ENCOUNTER
Reason for call:   [x] Prior Auth  [] Other:     Caller:  [] Patient  [x] Pharmacy  Name: [facility]   Address: [address]   Callback Number: [phone]    Medication: Wegovy     Dose/Frequency: 1 mg/0.5 mL. Inject 0.5 mL under the skin once a week.     Quantity: 2 mL     Ordering Provider:   [x] PCP/Provider -   [] Speciality/Provider -

## 2024-05-29 NOTE — TELEPHONE ENCOUNTER
PA for Wegovy 1mg     Submitted via    [x]CMM-KEY [id number]  []Surescripts-Case ID #   []Faxed to plan   []Other website   []Phone call Case ID #     Office notes sent, clinical questions answered. Awaiting determination    Turnaround time for your insurance to make a decision on your Prior Authorization can take 7-21 business days.

## 2024-05-30 NOTE — TELEPHONE ENCOUNTER
PA for Wegovy 1mg Denied    Reason:(Screenshot if applicable)        Message sent to office clinical pool Yes    Denial letter scanned into Media Yes    Appeal started No ( Provider will need to decide if appeal is warranted and send clinical documentation to PA team for initiation.)    **Please follow up with your patient regarding denial and next steps**

## 2024-06-04 DIAGNOSIS — F90.2 ATTENTION DEFICIT HYPERACTIVITY DISORDER (ADHD), COMBINED TYPE: ICD-10-CM

## 2024-06-04 RX ORDER — DEXTROAMPHETAMINE SACCHARATE, AMPHETAMINE ASPARTATE, DEXTROAMPHETAMINE SULFATE AND AMPHETAMINE SULFATE 2.5; 2.5; 2.5; 2.5 MG/1; MG/1; MG/1; MG/1
10 TABLET ORAL
Qty: 60 TABLET | Refills: 0 | Status: SHIPPED | OUTPATIENT
Start: 2024-06-04

## 2024-06-04 NOTE — TELEPHONE ENCOUNTER
Reason for call:   [x] Refill   [] Prior Auth  [] Other:     Office:   [x] PCP/Provider - Denys Morrow MD   [] Specialty/Provider -     Medication: amphetamine-dextroamphetamine (ADDERALL) 10 mg tablet     Dose/Frequency: Take 1 tablet (10 mg total) by mouth 2 (two) times a day Max Daily Amount: 20 mg     Quantity: #60    Pharmacy: [place] [id number] [place] [facility]  [address] [phone]     Does the patient have enough for 3 days?   [] Yes   [x] No - Send as HP to POD

## 2024-06-04 NOTE — TELEPHONE ENCOUNTER
Last seen 3/18/24     1 [address] REYES 1995 [id number] [place]      Search Criteria  NAME DATE OF BIRTH DATE RANGE  Brittney Reyes 1995 06- To 06-  REQUESTER NAME REQUESTED DATE  LATRICIA REYESKAYE 06- 18:48:05 ([facility])  Summary  Prescriptions  4  Prescribers  1  Pharmacies  1  Drug Classes  Benzodiazepines  0  Stimulants  4  Opioids  0  Muscle Relaxants  0  Opioid Dosage  Total MME for Active Prescriptions  0    Average MME  0.00         Prescriptions  Notifications    Prescribers  Pharmacies  MME Graph    Show All     [place]   Drug Categories:      Stimulants     Show  10  entries  Search:  PATIENT ID PRESCRIPTION # FILLED WRITTEN DRUG LABEL QTY DAYS STRENGTH MME** PRESCRIBER PHARMACY PAYMENT REFILL #/AUTH STATE DETAIL  1 [id number] 03/18/2024 03/18/2024 Amphetamine Salt Combo (Tablet) 60.0 30 10 MG NA FANY) [facility] [place] 0 / 0 [place]   1 [id number] 01/31/2024 01/31/2024 Amphetamine Salt Combo (Tablet) 60.0 30 10 MG NA FANY) [facility] [place] 0 / 0 [place]   1 [id number] 10/11/2023 10/11/2023 Amphetamine Salt Combo (Tablet) 60.0 30 10 MG NA FANY) [facility] [place] / 0 [place]   1 [id number] 06/18/2023 06/12/2023 Amphetamine Salt Combo (Tablet) 60.0 30 10 MG NA FANY) [facility]

## 2024-07-19 DIAGNOSIS — F90.2 ATTENTION DEFICIT HYPERACTIVITY DISORDER (ADHD), COMBINED TYPE: ICD-10-CM

## 2024-07-19 RX ORDER — DEXTROAMPHETAMINE SACCHARATE, AMPHETAMINE ASPARTATE, DEXTROAMPHETAMINE SULFATE AND AMPHETAMINE SULFATE 2.5; 2.5; 2.5; 2.5 MG/1; MG/1; MG/1; MG/1
10 TABLET ORAL
Qty: 60 TABLET | Refills: 0 | Status: SHIPPED | OUTPATIENT
Start: 2024-07-19

## 2024-07-19 NOTE — TELEPHONE ENCOUNTER
Medication: Amphetamine - Dextroamphetamine    Dose/Frequency: 10 mg    Quantity: 60    [facility]     Office:   [x] PCP/Provider -   [] Speciality/Provider -     Does the patient have enough for 3 days?   [x] Yes   [] No - Send as HP to POD

## 2024-07-19 NOTE — TELEPHONE ENCOUNTER
Last seen 3/18/24.        Patients  SELECT PATIENT ID NAME  GENDER [facility]   1 BRITTNEY REYES 1995 F [id number] [place]      Search Criteria  NAME DATE OF BIRTH DATE RANGE  brittney reyes 1995 07- To 2024  REQUESTER NAME REQUESTED DATE  MERLYINDIRA [facility] 2024 18:30:06 ([facility])  Summary  Prescriptions  4  Prescribers  1  Pharmacies  1  Drug Classes  Benzodiazepines  0  Stimulants  4  Opioids  0  Muscle Relaxants  0  Opioid Dosage  Total MME for Active Prescriptions  0    Average MME  0.00         Prescriptions  Notifications    Prescribers  Pharmacies  MME Graph    Show All     [place]   Drug Categories:      Stimulants     Show  10  entries  Search:  PATIENT ID PRESCRIPTION # FILLED WRITTEN DRUG LABEL QTY DAYS STRENGTH MME** PRESCRIBER PHARMACY PAYMENT REFILL #/AUTH STATE DETAIL  1 [id number] 2024 Amphetamine Salt Combo (Tablet) 60.0 30 10 MG NA FANY) [facility] [place] / 0 [place]   1 [id number] 2024 Amphetamine Salt Combo (Tablet) 60.0 30 10 MG NA FANY) [facility] [place] / 0 [place]   1 [id number] 2024 Amphetamine Salt Combo (Tablet) 60.0 30 10 MG NA FANY) [facility] [place] / 0 [place]   1 [id number] 10/11/2023 10/11/2023 Amphetamine Salt Combo (Tablet) 60.0 30 10 MG NA FANY) [facility] [place] / 0 [place]

## 2024-09-08 DIAGNOSIS — F90.2 ATTENTION DEFICIT HYPERACTIVITY DISORDER (ADHD), COMBINED TYPE: ICD-10-CM

## 2024-09-09 RX ORDER — DEXTROAMPHETAMINE SACCHARATE, AMPHETAMINE ASPARTATE, DEXTROAMPHETAMINE SULFATE AND AMPHETAMINE SULFATE 2.5; 2.5; 2.5; 2.5 MG/1; MG/1; MG/1; MG/1
10 TABLET ORAL
Qty: 60 TABLET | Refills: 0 | Status: SHIPPED | OUTPATIENT
Start: 2024-09-09

## 2024-09-09 NOTE — TELEPHONE ENCOUNTER
Refills have been requested for the following medications:         amphetamine-dextroamphetamine (ADDERALL) 10 mg tablet [Denys Morrow MD]     Preferred pharmacy: [place] [id number] [place] [facility] [place] [facility] [address]    Last seen 3/18/24     1 BRITTNEY REYES 1995 F [id number] [place]      Search Criteria  Name Date of Birth Date Range  Brittney Reyes 1995 09- To 09-  Requester Name Requested Date  DENYS MORROW 09- 12:30:22 ([facility])  Summary  Prescriptions  5  Prescribers  1  Pharmacies  1  Drug Classes  Benzodiazepines  0  Stimulants  5  Opioids  0  Muscle Relaxants  0  Opioid Dosage  Total MME for Active Prescriptions  0    Average MME  0.00         Prescriptions  Notifications    Prescribers  Pharmacies  MME Graph    Show All     [place]   Drug Categories:      Stimulants     Show  10  entries  Search:  Patient Id Prescription # Filled Written Drug Label Qty Days Strength MME** Prescriber Pharmacy Payment REFILL #/Auth State Detail  1 [id number] 07/19/2024 07/19/2024 Amphetamine Salt Combo (Tablet) 60.0 30 10 MG NA FANY) [facility] [place] / 0 [place]   1 [id number] 06/04/2024 06/04/2024 Amphetamine Salt Combo (Tablet) 60.0 30 10 MG NA FANY) [facility] [place] / 0 [place]   1 [id number] 03/18/2024 03/18/2024 Amphetamine Salt Combo (Tablet) 60.0 30 10 MG NA FANY) [facility] [place] / 0 [place]   1 [id number] 01/31/2024 01/31/2024 Amphetamine Salt Combo (Tablet) 60.0 30 10 MG NA FANY) [facility] [place] / 0 [place]   1 [id number] 10/11/2023 10/11/2023 Amphetamine Salt Combo (Tablet) 60.0 30 10 MG NA FANY) [facility] [place] / 0 [place]

## 2024-10-07 DIAGNOSIS — F90.2 ATTENTION DEFICIT HYPERACTIVITY DISORDER (ADHD), COMBINED TYPE: ICD-10-CM

## 2024-10-08 RX ORDER — DEXTROAMPHETAMINE SACCHARATE, AMPHETAMINE ASPARTATE, DEXTROAMPHETAMINE SULFATE AND AMPHETAMINE SULFATE 2.5; 2.5; 2.5; 2.5 MG/1; MG/1; MG/1; MG/1
10 TABLET ORAL
Qty: 60 TABLET | Refills: 0 | Status: SHIPPED | OUTPATIENT
Start: 2024-10-08

## 2024-10-08 NOTE — TELEPHONE ENCOUNTER
Last seen 3/18/24 and I copied past refills into chart from pdmp web site and sent for provider approval        Patient Prescription Report    Patients      Select Patient Id Name  Gender [facility]   [] 1 BRITTNEY REYES 1995 F [id number] [place]           Search Criteria    Name Date of Birth Date Range   brittney reyes 1995 10- To 10-     Requester Name Requested Date   [facility] 10- 16:40:38 ([facility])     Summary  Prescriptions  6  Prescribers  1  Pharmacies  1  View Map  Drug Classes  Benzodiazepines  0  Stimulants  6  Opioids  0  Muscle Relaxants  0  Opioid Dosage  Total MME for Active Prescriptions  0    Average MME  0.00  MME Graph   MME Calculator     Prescriptions  Notifications    Prescribers  Pharmacies  MME Graph    [] [place] Drug Categories:     [] Stimulants      Showentries  Search:  Patient Id Prescription # Filled Written Drug Label Qty Days Strength MME** Prescriber Pharmacy Payment REFILL #/Auth State Detail   1 [id number] 09/10/2024 2024 Amphetamine Salt Combo (Tablet) 60.0 30 10 MG NA FANY) [facility] [place] / 0 [place]    1 [id number] 2024 Amphetamine Salt Combo (Tablet) 60.0 30 10 MG NA FANY) [facility] [place] / 0 [place]    1 [id number] 2024 Amphetamine Salt Combo (Tablet) 60.0 30 10 MG NA FANY) [facility] [place] / 0 [place]    1 [id number] 2024 Amphetamine Salt Combo (Tablet) 60.0 30 10 MG NA FANY) [facility] [place] / 0 [place]    1 [id number] 2024 Amphetamine Salt Combo (Tablet) 60.0 30 10 MG NA FANY) [facility] [place] / [place]    1 [id number] 10/11/2023 10/11/2023 Amphetamine Salt Combo (Tablet) 60.0 30 10 MG NA FANY) [facility] [place] 0 / 0 [place]    Showing 1 to 6 of 6 entries  [id number]     * Per CDC guidance, the conversion factors and associated daily morphine milligram equivalents for drugs prescribed as part of medication-assisted treatment for opioid use disorder should not be used to benchmark against dosage thresholds meant for opioids prescribed for pain.  Report Disclaimer:  Information from the [place] Prescription Drug Monitoring Program (PDMP) database is protected health information and any information accessed must be treated as confidential. Any person who knowingly or intentionally makes an unauthorized disclosure of information from the PDMP database will be subject to civil and criminal penalties as set forth in the ABC-MAP Act 191 of 2014; Act of Oct. 27, 2014, P.L. 2911, No. 191.    The information in the PDMP database is submitted by pharmacies and may contain errors and omissions. Additionally, pharmacies may submit extraneous non-controlled substance dispensations to the PDMP database; if a non-controlled substance is present on one patient’s Prescription Report, it should not be assumed that this same medication will be reported on another patient’s Prescription Report. Independent verification of prescription information with pharmacies and prescribers may sometimes be prudent or necessary.  Educational content  CDC MME calculation guidelines

## 2024-11-20 ENCOUNTER — OFFICE VISIT (OUTPATIENT)
Dept: FAMILY MEDICINE CLINIC | Facility: CLINIC | Age: 29
End: 2024-11-20
Payer: COMMERCIAL

## 2024-11-20 VITALS
HEART RATE: 73 BPM | RESPIRATION RATE: 16 BRPM | DIASTOLIC BLOOD PRESSURE: 68 MMHG | BODY MASS INDEX: 22.44 KG/M2 | OXYGEN SATURATION: 99 % | SYSTOLIC BLOOD PRESSURE: 110 MMHG | TEMPERATURE: 97.2 F | HEIGHT: 67 IN | WEIGHT: 143 LBS

## 2024-11-20 DIAGNOSIS — F90.2 ATTENTION DEFICIT HYPERACTIVITY DISORDER (ADHD), COMBINED TYPE: Primary | ICD-10-CM

## 2024-11-20 DIAGNOSIS — Z23 ENCOUNTER FOR IMMUNIZATION: ICD-10-CM

## 2024-11-20 DIAGNOSIS — F41.1 GENERALIZED ANXIETY DISORDER: ICD-10-CM

## 2024-11-20 DIAGNOSIS — E78.2 MIXED HYPERLIPIDEMIA: ICD-10-CM

## 2024-11-20 DIAGNOSIS — K21.9 GASTROESOPHAGEAL REFLUX DISEASE WITHOUT ESOPHAGITIS: ICD-10-CM

## 2024-11-20 PROCEDURE — 90471 IMMUNIZATION ADMIN: CPT

## 2024-11-20 PROCEDURE — 99214 OFFICE O/P EST MOD 30 MIN: CPT | Performed by: FAMILY MEDICINE

## 2024-11-20 PROCEDURE — 90673 RIV3 VACCINE NO PRESERV IM: CPT

## 2024-11-20 RX ORDER — DEXTROAMPHETAMINE SACCHARATE, AMPHETAMINE ASPARTATE, DEXTROAMPHETAMINE SULFATE AND AMPHETAMINE SULFATE 2.5; 2.5; 2.5; 2.5 MG/1; MG/1; MG/1; MG/1
10 TABLET ORAL
Qty: 60 TABLET | Refills: 0 | Status: SHIPPED | OUTPATIENT
Start: 2024-11-20

## 2024-11-20 RX ORDER — TENAPANOR HYDROCHLORIDE 53.2 MG/1
TABLET ORAL
COMMUNITY
Start: 2024-11-04

## 2024-11-20 NOTE — ASSESSMENT & PLAN NOTE
Stable on Adderall 10 mg twice a day. Continue same.  Will continue to monitor.    Orders:    amphetamine-dextroamphetamine (ADDERALL) 10 mg tablet; Take 1 tablet (10 mg total) by mouth 2 (two) times a day Max Daily Amount: 20 mg

## 2024-11-20 NOTE — ASSESSMENT & PLAN NOTE
Well-controlled without medication.  Continue to follow low-fat diet.  We will continue to monitor fasting lipid profile.    Orders:    Comprehensive metabolic panel; Future    TSH, 3rd generation with Free T4 reflex; Future    CBC and differential; Future    Lipid Panel with Direct LDL reflex; Future

## 2024-11-20 NOTE — PROGRESS NOTES
Name: Brittney D Reyes      : 1995      MRN: [id number]  Encounter Provider: Denys Morrow MD  Encounter Date: 2024   [facility]  :  Assessment & Plan  Attention deficit hyperactivity disorder (ADHD), combined type  Stable on Adderall 10 mg twice a day. Continue same.  Will continue to monitor.    Orders:    amphetamine-dextroamphetamine (ADDERALL) 10 mg tablet; Take 1 tablet (10 mg total) by mouth 2 (two) times a day Max Daily Amount: 20 mg    Mixed hyperlipidemia  Well-controlled without medication.  Continue to follow low-fat diet.  We will continue to monitor fasting lipid profile.    Orders:    Comprehensive metabolic panel; Future    TSH, 3rd generation with Free T4 reflex; Future    CBC and differential; Future    Lipid Panel with Direct LDL reflex; Future    BMI 28.0-28.9,adult  Decreased Wegovy to 0.5 mg to be used as needed for weight maintenance.  Follow up 6 months.   Encounter for immunization    Orders:    influenza vaccine, recombinant, PF, 0.5 mL IM (Flublok)    Gastroesophageal reflux disease without esophagitis  Doing well without medications.  Will continue to monitor.         Generalized anxiety disorder  - stable. Well managed on xanax 0.25mg.    - will continue to monitor                History of Present Illness     Brigette is a 29 year old female presenting to the office today for weight and ADHD management. She takes adderall 10 mg twice a day and denies any side effects. She uses the Wegovy 1 mg injections every month and a half for weight management. She finds that on the first day of the injection she is nauseated and unable to eat for the day. She uses ondansetron for the nausea.   She notes she had sinusitis about a month ago and was treated with antibiotics and steroids but still has some lingering anosmia and rhinorrhea.       Review of Systems   Constitutional:  Negative for chills and fever.   HENT:  Positive for  "rhinorrhea. Negative for ear pain and sore throat.    Eyes:  Negative for pain and visual disturbance.   Respiratory:  Negative for cough and shortness of breath.    Cardiovascular:  Negative for chest pain and palpitations.   Gastrointestinal:  Negative for abdominal pain, constipation, diarrhea, nausea and vomiting.   Genitourinary:  Negative for dysuria and hematuria.   Musculoskeletal:  Negative for arthralgias and back pain.   Skin:  Negative for color change and rash.   Neurological:  Negative for seizures, syncope and headaches.   All other systems reviewed and are negative.         Objective   /68 (BP Location: Left arm, Patient Position: Sitting, Cuff Size: Large)   Pulse 73   Temp (!) 97.2 °F (36.2 °C) (Tympanic)   Resp 16   Ht 5' 7\" (1.702 m)   Wt 64.9 kg (143 lb)   SpO2 99%   BMI 22.40 kg/m²      Physical Exam  Vitals and nursing note reviewed.   Constitutional:       General: She is not in acute distress.     Appearance: She is well-developed.   HENT:      Head: Normocephalic and atraumatic.      Right Ear: Tympanic membrane normal.      Left Ear: Tympanic membrane normal.      Mouth/Throat:      Mouth: Mucous membranes are moist.      Pharynx: Oropharynx is clear.   Eyes:      Conjunctiva/sclera: Conjunctivae normal.   Cardiovascular:      Rate and Rhythm: Normal rate and regular rhythm.      Heart sounds: No murmur heard.  Pulmonary:      Effort: Pulmonary effort is normal. No respiratory distress.      Breath sounds: Normal breath sounds.   Musculoskeletal:         General: No swelling.      Cervical back: Neck supple.   Skin:     General: Skin is warm and dry.      Capillary Refill: Capillary refill takes less than 2 seconds.   Neurological:      Mental Status: She is alert.   Psychiatric:         Mood and Affect: Mood normal.         "

## 2024-12-30 ENCOUNTER — TELEPHONE (OUTPATIENT)
Dept: FAMILY MEDICINE CLINIC | Facility: CLINIC | Age: 29
End: 2024-12-30

## 2024-12-30 NOTE — TELEPHONE ENCOUNTER
PA for (WEGOVY) 0.5 MG/0.5ML SUBMITTED to [employer]    via    [x]CMM-KEY: [id number]  []Surescripts-Case ID #   []Availity-Auth ID # NDC #   []Faxed to plan   []Other website   []Phone call Case ID #     []PA sent as URGENT    All office notes, labs and other pertaining documents and studies sent. Clinical questions answered. Awaiting determination from insurance company.     Turnaround time for your insurance to make a decision on your Prior Authorization can take 7-21 business days.

## 2024-12-31 NOTE — TELEPHONE ENCOUNTER
PA for (WEGOVY) 0.5 MG/0.5ML DENIED    Reason:(Screenshot if applicable)        Message sent to office clinical pool Yes    Denial letter scanned into Media Yes    Appeal started No (Provider will need to decide if appeal is warranted and send clinical documentation to Prior Authorization Team for initiation.)    **Please follow up with your patient regarding denial and next steps**

## 2024-12-31 NOTE — TELEPHONE ENCOUNTER
Pt wegovy is not covered because her bmi is not greater the 30 and BMI of at least 27 with underlining conditions like dm or hypertension.  Last BMI in Nov was 22.40    Please advise

## 2025-01-02 NOTE — TELEPHONE ENCOUNTER
LM for pt to call office back   Please let pt know Wegovy was denied because her BMI is under 30.   Last time she was here in November her BMI was 22.40

## 2025-01-06 DIAGNOSIS — R11.0 NAUSEA: ICD-10-CM

## 2025-01-07 RX ORDER — ONDANSETRON 4 MG/1
4 TABLET, FILM COATED ORAL EVERY 8 HOURS PRN
Qty: 20 TABLET | Refills: 0 | Status: SHIPPED | OUTPATIENT
Start: 2025-01-07

## 2025-01-20 DIAGNOSIS — F90.2 ATTENTION DEFICIT HYPERACTIVITY DISORDER (ADHD), COMBINED TYPE: ICD-10-CM

## 2025-01-21 NOTE — TELEPHONE ENCOUNTER
Refills have been requested for the following medications:         Ibsrela 50 MG TABS         amphetamine-dextroamphetamine (ADDERALL) 10 mg tablet [Denys Morrow MD]     Preferred pharmacy: [place] [address] [address]    Last seen 11/20/24       1 [address] REYES 1995 [id number] [facility] [id number] [place]      Search Criteria  Name Date of Birth Date Range  Brittney Reyes 1995 01- To 01-  Requester Name Requested Date  DENYS MORROW 01- 13:29:44 ([facility])  Summary  Prescriptions  7  Prescribers  1  Pharmacies  1  Drug Classes  Benzodiazepines  0  Stimulants  7  Opioids  0  Muscle Relaxants  0  Opioid Dosage  Total MME for Active Prescriptions  0    Average MME  0.00         Prescriptions  Notifications    Prescribers  Pharmacies  MME Graph    Show All     [place]   Drug Categories:      Stimulants     Show  10  entries  Search:  Patient Id Prescription # Filled Written Drug Label Qty Days Strength MME** Prescriber Pharmacy Payment REFILL #/Auth State Detail  1 [id number] 11/20/2024 11/20/2024 Amphetamine Salt Combo (Tablet) 60.0 30 10 MG SAL SOARES) [facility] Commercial [place] 0 / 0 [place]   1 [id number] 10/09/2024 10/08/2024 Amphetamine Salt Combo (Tablet) 60.0 30 10 MG NA FANY) [facility] Commercial Insurance 0 / 0 [place]   1 [id number] 09/10/2024 09/09/2024 Amphetamine Salt Combo (Tablet) 60.0 30 10 MG SAL SOARES) [facility] Commercial Insurance 0 / 0 [place]   1 [id number] 07/19/2024 07/19/2024 Amphetamine Salt Combo (Tablet) 60.0 30 10 MG NA FANY) [facility] Commercial Insurance 0 / 0 [place]   1 [id number] 06/04/2024 06/04/2024 Amphetamine Salt Combo (Tablet) 60.0 30 10 MG SAL SOARES) [facility] Commercial Insurance 0 / 0 [place]   1 [id number] 03/18/2024 03/18/2024 Amphetamine Salt Combo (Tablet) 60.0 30 10  MG NA LATRICIA(MD) [facility] Commercial Insurance 0 / 0 [place]   1 [id number] 01/31/2024 01/31/2024 Amphetamine Salt Combo (Tablet) 60.0 30 10 MG NA FANY) [facility] Commercial [place] 0 / 0 [place]

## 2025-01-22 RX ORDER — TENAPANOR HYDROCHLORIDE 53.2 MG/1
50 TABLET ORAL DAILY
Qty: 30 TABLET | Refills: 0 | Status: SHIPPED | OUTPATIENT
Start: 2025-01-22

## 2025-01-22 RX ORDER — DEXTROAMPHETAMINE SACCHARATE, AMPHETAMINE ASPARTATE, DEXTROAMPHETAMINE SULFATE AND AMPHETAMINE SULFATE 2.5; 2.5; 2.5; 2.5 MG/1; MG/1; MG/1; MG/1
10 TABLET ORAL
Qty: 60 TABLET | Refills: 0 | Status: SHIPPED | OUTPATIENT
Start: 2025-01-22

## 2025-02-24 ENCOUNTER — TELEPHONE (OUTPATIENT)
Dept: FAMILY MEDICINE CLINIC | Facility: CLINIC | Age: 30
End: 2025-02-24

## 2025-03-07 DIAGNOSIS — F90.2 ATTENTION DEFICIT HYPERACTIVITY DISORDER (ADHD), COMBINED TYPE: ICD-10-CM

## 2025-03-07 RX ORDER — TENAPANOR HYDROCHLORIDE 53.2 MG/1
50 TABLET ORAL DAILY
Qty: 30 TABLET | Refills: 0 | Status: SHIPPED | OUTPATIENT
Start: 2025-03-07

## 2025-03-07 NOTE — TELEPHONE ENCOUNTER
Pt last seen 11/20/24.  Pt requesting:      Ibsrela 50 MG TABS [Denys Morrow MD]     Preferred pharmacy: [facility] [address] [place] [facility] [place] [place] [address]

## 2025-04-15 DIAGNOSIS — F90.2 ATTENTION DEFICIT HYPERACTIVITY DISORDER (ADHD), COMBINED TYPE: ICD-10-CM

## 2025-04-16 RX ORDER — DEXTROAMPHETAMINE SACCHARATE, AMPHETAMINE ASPARTATE, DEXTROAMPHETAMINE SULFATE AND AMPHETAMINE SULFATE 2.5; 2.5; 2.5; 2.5 MG/1; MG/1; MG/1; MG/1
10 TABLET ORAL
Qty: 60 TABLET | Refills: 0 | Status: SHIPPED | OUTPATIENT
Start: 2025-04-16

## 2025-04-16 NOTE — TELEPHONE ENCOUNTER
Refills have been requested for the following medications:         amphetamine-dextroamphetamine (ADDERALL) 10 mg tablet [Denys Morrow MD]     [facility] [place] [address] [place] [facility] [place] [facility]    Last seen 11/20/24  LM for pt to call [facility] back [facility] and sent [employer] message      1 [address] REYES 1995 [id number] [facility] [id number] [address]      Search Criteria  Name Date of Birth Date Range  Brittney Reyes 1995 04- To 04-  Requester Name Requested Date  DENYS MORROW 04- 12:51:32 ([facility])  Summary  Prescriptions  6  Prescribers  1  Pharmacies  1  Drug Classes  Benzodiazepines  0  Stimulants  6  Opioids  0  Muscle Relaxants  0  Opioid Dosage  Total MME for Active Prescriptions  0    Average MME  0.00         Prescriptions  Notifications    Prescribers  Pharmacies  MME Graph    Show All     [place]   Drug Categories:      Stimulants     Show  10  entries  Search:  Patient Id Prescription # Filled Written Drug Label Qty Days Strength MME** Prescriber Pharmacy Payment REFILL #/Auth State Detail  1 [id number] 01/23/2025 01/22/2025 Amphetamine Salt Combo (Tablet) 60.0 30 10 MG NA FANY) [facility] Commercial Insurance 0 / 0 [place]   1 [id number] 11/20/2024 11/20/2024 Amphetamine Salt Combo (Tablet) 60.0 30 10 MG NA FANY) [facility] Commercial Insurance 0 / 0 [place]   1 [id number] 10/09/2024 10/08/2024 Amphetamine Salt Combo (Tablet) 60.0 30 10 MG NA FANY) [facility] Commercial Insurance 0 / 0 [place]   1 [id number] 09/10/2024 09/09/2024 Amphetamine Salt Combo (Tablet) 60.0 30 10 MG NA FANY) [facility] Commercial Insurance 0 / 0 [place]   1 [id number] 07/19/2024 07/19/2024 Amphetamine Salt Combo (Tablet) 60.0 30 10 MG NA FANY) [facility] Commercial Insurance 0 / 0 [place]   1 [id number] 06/04/2024 06/04/2024 Amphetamine Salt  Combo (Tablet) 60.0 30 10 MG NA LATRICIA(MD) [facility]

## 2025-06-10 DIAGNOSIS — F90.2 ATTENTION DEFICIT HYPERACTIVITY DISORDER (ADHD), COMBINED TYPE: ICD-10-CM

## 2025-06-11 NOTE — TELEPHONE ENCOUNTER
Refills have been requested for the following medications:         amphetamine-dextroamphetamine (ADDERALL) 10 mg tablet [Latricia Morrow MD]     Pref[facility] pharmacy: [place] [address] [place] [facility] [place] [facility] [address]    Last seen 11/20/24  Message sent to pt stating she is due for follow up        1 BRITTNEY REYES 1995 F [id number] [facility] [id number] [address]      Search Criteria  Name Date of Birth Date Range  Brittney Reyes 1995 06- To 06-  Requester Name Requested Date  LATRICIA MORROW 06- 11:19:13 ([facility])  Summary  Prescriptions  6  Prescribers  1  Pharmacies  1  Drug Classes  Benzodiazepines  0  Stimulants  6  Opioids  0  Muscle Relaxants  0  Opioid Dosage  Total MME for Active Prescriptions  0    Average MME  0.00         Prescriptions  Notifications    Prescribers  Pharmacies  MME Graph    Show All     [place]   Drug Categories:      Stimulants     Show  10  entries  Search:  Patient Id Prescription # Sold Filled Written Drug Label Qty Days Strength MME* Prescriber Pharmacy Payment REFILL #/Auth State Detail  1 [id number] ** 04/16/2025 04/16/2025 Amphetamine Salt Combo (Tablet) 60.0 30 10 MG NA FANY) [facility] Commercial [place] 0 / 0 [place]   1 [id number] ** 01/23/2025 01/22/2025 Amphetamine Salt Combo (Tablet) 60.0 30 10 MG NA FANY) [facility] Commercial Insurance 0 / 0 [place]   1 [id number] ** 11/20/2024 11/20/2024 Amphetamine Salt Combo (Tablet) 60.0 30 10 MG NA FANY) [facility] Commercial Insurance 0 / 0 [place]   1 [id number] ** 10/09/2024 10/08/2024 Amphetamine Salt Combo (Tablet) 60.0 30 10 MG NA FANY) [facility] Commercial Insurance 0 / 0 [place]   1 [id number] ** 09/10/2024 09/09/2024 Amphetamine Salt Combo (Tablet) 60.0 30 10 MG NA WASCARTERM(MD) [facility] Commercial Insurance 0 /  0 [place]   1 [id number] ** 07/19/2024 07/19/2024 Amphetamine Salt Combo (Tablet) 60.0 30 10 MG NA FANY) [facility]

## 2025-06-12 RX ORDER — DEXTROAMPHETAMINE SACCHARATE, AMPHETAMINE ASPARTATE, DEXTROAMPHETAMINE SULFATE AND AMPHETAMINE SULFATE 2.5; 2.5; 2.5; 2.5 MG/1; MG/1; MG/1; MG/1
10 TABLET ORAL
Qty: 60 TABLET | Refills: 0 | Status: SHIPPED | OUTPATIENT
Start: 2025-06-12